# Patient Record
Sex: MALE | Race: WHITE | NOT HISPANIC OR LATINO | URBAN - METROPOLITAN AREA
[De-identification: names, ages, dates, MRNs, and addresses within clinical notes are randomized per-mention and may not be internally consistent; named-entity substitution may affect disease eponyms.]

---

## 2023-03-06 LAB
CHOLESTEROL (MG/DL) IN SER/PLAS: 156 MG/DL (ref 0–199)
CHOLESTEROL IN HDL (MG/DL) IN SER/PLAS: 58 MG/DL
CHOLESTEROL/HDL RATIO: 2.7
ESTIMATED AVERAGE GLUCOSE FOR HBA1C: 117 MG/DL
HEMOGLOBIN A1C/HEMOGLOBIN TOTAL IN BLOOD: 5.7 %
INR IN PPP BY COAGULATION ASSAY: 2.1 (ref 0.9–1.1)
LDL: 80 MG/DL (ref 0–99)
PROTHROMBIN TIME (PT) IN PPP BY COAGULATION ASSAY: 24.7 SEC (ref 9.8–13.4)
TRIGLYCERIDE (MG/DL) IN SER/PLAS: 89 MG/DL (ref 0–149)
VLDL: 18 MG/DL (ref 0–40)

## 2023-04-06 ENCOUNTER — TELEPHONE (OUTPATIENT)
Dept: PRIMARY CARE | Facility: CLINIC | Age: 83
End: 2023-04-06

## 2023-04-06 NOTE — TELEPHONE ENCOUNTER
Pt called and wants dr asked if its ok to take Natures Made Vitamin D 3 2000 iud, he's taking 2 tabs daily  Please advise

## 2023-07-14 DIAGNOSIS — Z00.00 ENCOUNTER FOR GENERAL ADULT MEDICAL EXAMINATION WITHOUT ABNORMAL FINDINGS: ICD-10-CM

## 2023-07-14 RX ORDER — SIMVASTATIN 40 MG/1
40 TABLET, FILM COATED ORAL NIGHTLY
Qty: 90 TABLET | Refills: 0 | Status: SHIPPED | OUTPATIENT
Start: 2023-07-14 | End: 2023-11-07

## 2023-09-15 ENCOUNTER — OFFICE VISIT (OUTPATIENT)
Dept: PRIMARY CARE | Facility: CLINIC | Age: 83
End: 2023-09-15
Payer: MEDICARE

## 2023-09-15 VITALS
BODY MASS INDEX: 23.32 KG/M2 | HEART RATE: 75 BPM | TEMPERATURE: 97.6 F | OXYGEN SATURATION: 98 % | WEIGHT: 148.58 LBS | HEIGHT: 67 IN

## 2023-09-15 DIAGNOSIS — F33.9 DEPRESSION, RECURRENT (CMS-HCC): ICD-10-CM

## 2023-09-15 DIAGNOSIS — R73.9 HYPERGLYCEMIA: ICD-10-CM

## 2023-09-15 DIAGNOSIS — N30.00 ACUTE CYSTITIS WITHOUT HEMATURIA: ICD-10-CM

## 2023-09-15 DIAGNOSIS — F41.1 GAD (GENERALIZED ANXIETY DISORDER): ICD-10-CM

## 2023-09-15 DIAGNOSIS — I26.99 PULMONARY INFARCTION (MULTI): ICD-10-CM

## 2023-09-15 DIAGNOSIS — C43.59 MALIGNANT MELANOMA OF OTHER PART OF TRUNK (MULTI): ICD-10-CM

## 2023-09-15 DIAGNOSIS — Z13.820 SCREENING FOR OSTEOPOROSIS: ICD-10-CM

## 2023-09-15 DIAGNOSIS — Z00.00 MEDICARE ANNUAL WELLNESS VISIT, SUBSEQUENT: Primary | ICD-10-CM

## 2023-09-15 DIAGNOSIS — I10 HYPERTENSION, UNSPECIFIED TYPE: ICD-10-CM

## 2023-09-15 PROBLEM — M81.8 OSTEOPOROSIS, IDIOPATHIC: Status: ACTIVE | Noted: 2023-09-15

## 2023-09-15 PROCEDURE — 1157F ADVNC CARE PLAN IN RCRD: CPT | Performed by: INTERNAL MEDICINE

## 2023-09-15 PROCEDURE — G0444 DEPRESSION SCREEN ANNUAL: HCPCS | Performed by: INTERNAL MEDICINE

## 2023-09-15 PROCEDURE — 99213 OFFICE O/P EST LOW 20 MIN: CPT | Performed by: INTERNAL MEDICINE

## 2023-09-15 PROCEDURE — 1159F MED LIST DOCD IN RCRD: CPT | Performed by: INTERNAL MEDICINE

## 2023-09-15 PROCEDURE — 1160F RVW MEDS BY RX/DR IN RCRD: CPT | Performed by: INTERNAL MEDICINE

## 2023-09-15 PROCEDURE — 1036F TOBACCO NON-USER: CPT | Performed by: INTERNAL MEDICINE

## 2023-09-15 PROCEDURE — G0439 PPPS, SUBSEQ VISIT: HCPCS | Performed by: INTERNAL MEDICINE

## 2023-09-15 PROCEDURE — 1170F FXNL STATUS ASSESSED: CPT | Performed by: INTERNAL MEDICINE

## 2023-09-15 RX ORDER — LATANOPROST 50 UG/ML
SOLUTION/ DROPS OPHTHALMIC
COMMUNITY

## 2023-09-15 RX ORDER — SAW PALMETTO 160 MG
160 CAPSULE ORAL 3 TIMES DAILY
COMMUNITY
Start: 2012-10-01 | End: 2023-10-13 | Stop reason: ALTCHOICE

## 2023-09-15 RX ORDER — TAMSULOSIN HYDROCHLORIDE 0.4 MG/1
0.8 CAPSULE ORAL DAILY
COMMUNITY
Start: 2011-01-04 | End: 2023-11-28 | Stop reason: WASHOUT

## 2023-09-15 RX ORDER — AMLODIPINE BESYLATE 5 MG/1
5 TABLET ORAL DAILY
Qty: 30 TABLET | Refills: 5 | Status: SHIPPED | OUTPATIENT
Start: 2023-09-15 | End: 2023-09-15 | Stop reason: SINTOL

## 2023-09-15 RX ORDER — FINASTERIDE 5 MG/1
5 TABLET, FILM COATED ORAL
COMMUNITY
Start: 2023-08-16 | End: 2023-11-28 | Stop reason: WASHOUT

## 2023-09-15 RX ORDER — LOSARTAN POTASSIUM AND HYDROCHLOROTHIAZIDE 12.5; 5 MG/1; MG/1
1 TABLET ORAL DAILY
Qty: 30 TABLET | Refills: 11 | Status: SHIPPED | OUTPATIENT
Start: 2023-09-15 | End: 2023-10-13 | Stop reason: ALTCHOICE

## 2023-09-15 RX ORDER — PAROXETINE 10 MG/1
10 TABLET, FILM COATED ORAL EVERY MORNING
Qty: 30 TABLET | Refills: 1 | Status: SHIPPED | OUTPATIENT
Start: 2023-09-15 | End: 2023-10-13 | Stop reason: ALTCHOICE

## 2023-09-15 ASSESSMENT — ACTIVITIES OF DAILY LIVING (ADL)
GROCERY_SHOPPING: INDEPENDENT
TAKING_MEDICATION: INDEPENDENT
DOING_HOUSEWORK: INDEPENDENT
DRESSING: INDEPENDENT
MANAGING_FINANCES: INDEPENDENT
BATHING: INDEPENDENT

## 2023-09-15 ASSESSMENT — PATIENT HEALTH QUESTIONNAIRE - PHQ9
4. FEELING TIRED OR HAVING LITTLE ENERGY: SEVERAL DAYS
10. IF YOU CHECKED OFF ANY PROBLEMS, HOW DIFFICULT HAVE THESE PROBLEMS MADE IT FOR YOU TO DO YOUR WORK, TAKE CARE OF THINGS AT HOME, OR GET ALONG WITH OTHER PEOPLE: SOMEWHAT DIFFICULT
5. POOR APPETITE OR OVEREATING: SEVERAL DAYS
1. LITTLE INTEREST OR PLEASURE IN DOING THINGS: SEVERAL DAYS
7. TROUBLE CONCENTRATING ON THINGS, SUCH AS READING THE NEWSPAPER OR WATCHING TELEVISION: NEARLY EVERY DAY
SUM OF ALL RESPONSES TO PHQ QUESTIONS 1-9: 13
3. TROUBLE FALLING OR STAYING ASLEEP OR SLEEPING TOO MUCH: SEVERAL DAYS
3. TROUBLE FALLING OR STAYING ASLEEP OR SLEEPING TOO MUCH: NEARLY EVERY DAY
SUM OF ALL RESPONSES TO PHQ9 QUESTIONS 1 AND 2: 4
4. FEELING TIRED OR HAVING LITTLE ENERGY: NEARLY EVERY DAY
8. MOVING OR SPEAKING SO SLOWLY THAT OTHER PEOPLE COULD HAVE NOTICED. OR THE OPPOSITE, BEING SO FIGETY OR RESTLESS THAT YOU HAVE BEEN MOVING AROUND A LOT MORE THAN USUAL: NOT AT ALL
2. FEELING DOWN, DEPRESSED OR HOPELESS: NOT AT ALL
1. LITTLE INTEREST OR PLEASURE IN DOING THINGS: NOT AT ALL
9. THOUGHTS THAT YOU WOULD BE BETTER OFF DEAD, OR OF HURTING YOURSELF: NOT AT ALL
10. IF YOU CHECKED OFF ANY PROBLEMS, HOW DIFFICULT HAVE THESE PROBLEMS MADE IT FOR YOU TO DO YOUR WORK, TAKE CARE OF THINGS AT HOME, OR GET ALONG WITH OTHER PEOPLE: SOMEWHAT DIFFICULT
6. FEELING BAD ABOUT YOURSELF - OR THAT YOU ARE A FAILURE OR HAVE LET YOURSELF OR YOUR FAMILY DOWN: NOT AT ALL
SUM OF ALL RESPONSES TO PHQ9 QUESTIONS 1 AND 2: 0
5. POOR APPETITE OR OVEREATING: NOT AT ALL
2. FEELING DOWN, DEPRESSED OR HOPELESS: NEARLY EVERY DAY

## 2023-09-15 NOTE — PROGRESS NOTES
Assessment and Plan:  Problem List Items Addressed This Visit       RESOLVED: Pulmonary infarction (CMS/HCC)     Advise follow-up with the pulmonary service for anticoagulation         Relevant Orders    Albumin , Urine Random    CBC and Auto Differential    Comprehensive Metabolic Panel    Hemoglobin A1C    Lipid Panel    TSH with reflex to Free T4 if abnormal    Uric Acid    Urinalysis Microscopic Only    Urine Culture    Depression, recurrent (CMS/HCC)     Depression is chronic and quite common and notorious mental health disorder and it is quite common and widespread, there are several therapeutics available for depression now a days. It is considered as chemical imbalance disorder and with medications , it can be adjusted. There are side effects from SSRI and SNRIs but on long term, they are well tolerated. Please do not feel awkward or shy to contact us if feel tired, sleepy, lack of energy or aloof/ alone. Untreated depression can bring serious consequences including suicidal ideations, mental health counselling is available if need arises. Usually treatment of depression is long lasting therapy with periodic evaluations and follow ups. Pt with depression no longer have to feel frustrated, helpless or isolated.Detailed discussion was carried out.           Relevant Orders    Albumin , Urine Random    CBC and Auto Differential    Comprehensive Metabolic Panel    Hemoglobin A1C    Lipid Panel    TSH with reflex to Free T4 if abnormal    Uric Acid    Urinalysis Microscopic Only    Urine Culture    Malignant melanoma of other part of trunk (CMS/HCC)     Advised to follow-up with dermatologist         Relevant Orders    Albumin , Urine Random    CBC and Auto Differential    Comprehensive Metabolic Panel    Hemoglobin A1C    Lipid Panel    TSH with reflex to Free T4 if abnormal    Uric Acid    Urinalysis Microscopic Only    Urine Culture    Hypertension     Patients BP readings reviewed and addressed, as we age our  arteries turn stiffer and less elastic. Restricting salt consumption and staying physically fit with regular exercise regimen is the only way to keep our vasculature less tonic. Studies have shown that keeping ideal body wt, exercise routine about 140 to 150 minutes a week, eating variety of plant based diet and drinking plentiful water are quite helpful. Monitor BP twice or once a week at home and bring log to be reviewed by me. Uncontrolled BP has long term consequences including heart failure, myocardial infarction, accelerated atherosclerosis and kidney dysfunction. Therapy reviewed and explained.           Relevant Medications    PARoxetine (Paxil) 10 mg tablet    losartan-hydrochlorothiazide (Hyzaar) 50-12.5 mg tablet    Other Relevant Orders    Albumin , Urine Random    CBC and Auto Differential    Comprehensive Metabolic Panel    Hemoglobin A1C    Lipid Panel    TSH with reflex to Free T4 if abnormal    Uric Acid    Urinalysis Microscopic Only    Urine Culture    CT cardiac scoring wo IV contrast    Medicare annual wellness visit, subsequent - Primary    Relevant Orders    Albumin , Urine Random    CBC and Auto Differential    Comprehensive Metabolic Panel    Hemoglobin A1C    Lipid Panel    TSH with reflex to Free T4 if abnormal    Uric Acid    Urinalysis Microscopic Only    Urine Culture    Hyperglycemia    Relevant Orders    Hemoglobin A1C    Acute cystitis without hematuria    Relevant Orders    Urine Culture    DAVID (generalized anxiety disorder)    Relevant Medications    PARoxetine (Paxil) 10 mg tablet         Chief Complaint:   Annual Medicare Wellness Office Exam/Comprehensive Problem Focused Follow Up and Physical Exam      HPI: This is a 88-year-old patient  with children personal history of BPH hypertension hyperlipidemia pulmonary emboli complaining anxiety depression insomnia onset gradual duration few weeks progressed slowly aggravating factor underlying stress    Also complaining of  protein with uncontrolled hypertension    Negative for headache chest pain hematuria    Recently had urinary discomfort went to urology diagnosis high PSA Brar catheter possible prostate cancer very about the same            Patient Active Problem List:  Patient Active Problem List   Diagnosis    Depression, recurrent (CMS/HCC)    Malignant melanoma of other part of trunk (CMS/HCC)    Hypertension    Medicare annual wellness visit, subsequent    Osteoporosis, idiopathic    Hyperglycemia    Acute cystitis without hematuria    DAVID (generalized anxiety disorder)          Comprehensive Medical/Surgical/Social/Family History:  Family History   Problem Relation Name Age of Onset    Diabetes Mother      Heart failure Mother      Heart failure Father         Past Medical History:   Diagnosis Date    Acute upper respiratory infection, unspecified 10/24/2019    Acute upper respiratory infection    Anxiety disorder, unspecified     Anxiety    Disorder of prostate, unspecified     Prostate disease    Dorsalgia, unspecified 11/28/2017    Dorsodynia    Encounter for screening for malignant neoplasm of colon 10/24/2019    Colon cancer screening    Encounter for screening for malignant neoplasm of rectum 10/24/2019    Screening for rectal cancer    Hyperosmolality and hypernatremia 06/14/2018    Hypernatremia    Impacted cerumen, bilateral 07/26/2017    Excessive ear wax, bilateral    Other conditions influencing health status 12/28/2016    History of cough    Other disorders of phosphorus metabolism 10/24/2016    Low phosphate levels    Pain in right finger(s) 06/14/2018    Pain of right thumb    Personal history of diseases of the skin and subcutaneous tissue 02/12/2015    History of folliculitis    Personal history of diseases of the skin and subcutaneous tissue 02/28/2020    History of eczema    Personal history of other diseases of the circulatory system     History of hypertension    Personal history of other diseases of the  digestive system 08/02/2021    History of gastroesophageal reflux (GERD)    Personal history of other diseases of the musculoskeletal system and connective tissue     Personal history of arthritis    Personal history of other diseases of the nervous system and sense organs 07/26/2017    History of ear pain    Personal history of other diseases of the nervous system and sense organs 07/26/2017    History of impacted cerumen    Personal history of other diseases of the respiratory system 11/10/2016    History of allergic rhinitis    Personal history of other endocrine, nutritional and metabolic disease     History of hyperlipidemia    Personal history of other endocrine, nutritional and metabolic disease 02/28/2020    History of hyperkalemia    Personal history of other endocrine, nutritional and metabolic disease 07/27/2016    History of dehydration    Personal history of other endocrine, nutritional and metabolic disease 07/25/2016    History of dehydration    Personal history of other infectious and parasitic diseases     History of varicella    Personal history of other mental and behavioral disorders 03/19/2014    History of depression    Personal history of other specified conditions     History of edema    Personal history of other specified conditions 03/07/2014    History of edema    Personal history of other venous thrombosis and embolism     H/O blood clots    Personal history of pulmonary embolism     Personal history of pulmonary embolism    Personal history of urinary (tract) infections 12/08/2015    History of urinary tract infection    Trigger finger, right ring finger 02/28/2020    Trigger ring finger of right hand       Past Surgical History:   Procedure Laterality Date    HERNIA REPAIR         Social History     Socioeconomic History    Marital status:      Spouse name: None    Number of children: None    Years of education: None    Highest education level: None   Occupational History    None    Tobacco Use    Smoking status: Never    Smokeless tobacco: Never   Substance and Sexual Activity    Alcohol use: Never    Drug use: Never    Sexual activity: None   Other Topics Concern    None   Social History Narrative    None     Social Determinants of Health     Financial Resource Strain: Not on file   Food Insecurity: Not on file   Transportation Needs: Not on file   Physical Activity: Not on file   Stress: Not on file   Social Connections: Not on file   Intimate Partner Violence: Not on file   Housing Stability: Not on file       Tobacco/Alcohol/Opioid use, as well as Illicit Drug Use was screened for/reviewed and documented in Social Documentation section of the chart and medication list as appropriate    Allergies and Medications  Allergies   Allergen Reactions    Animal Dander Unknown     Current Outpatient Medications   Medication Sig Dispense Refill    finasteride (Proscar) 5 mg tablet 1 tablet (5 mg).      latanoprost (Xalatan) 0.005 % ophthalmic solution INSTILL 1 DROP INTO LEFT EYE AT BEDTIME      multivitamin capsule Take 1 capsule by mouth once daily.      saw palmetto 160 mg capsule Take 1 capsule (160 mg) by mouth 3 times a day.      simvastatin (Zocor) 40 mg tablet Take 1 tablet (40 mg) by mouth once daily at bedtime. 90 tablet 0    tamsulosin (Flomax) 0.4 mg 24 hr capsule Take 2 capsules (0.8 mg) by mouth once daily.      warfarin (Coumadin) 5 mg tablet TAKE 1 TABLET EVERY DAY AND A 1/2 ON WEDNESDAY AND SUNDAY 90 tablet 3    losartan-hydrochlorothiazide (Hyzaar) 50-12.5 mg tablet Take 1 tablet by mouth once daily. 30 tablet 11    PARoxetine (Paxil) 10 mg tablet Take 1 tablet (10 mg) by mouth once daily in the morning. 30 tablet 1     No current facility-administered medications for this visit.       Medications and Supplements  prescribed by me and other practitioners or clinical pharmacist (such as prescriptions, OTC's, herbal therapies and supplements) were reviewed and documented in the  "medical record.     Advance directives  Advanced Care Planning (including a Living Will, Healthcare POA, as well as specific end of life choices and/or directives), was discussed for approximately 16 minutes with the patient and/or surrogate, voluntarily, and documented in the Problem List of the medical record.     Cardiac Risk Assessment  Cardiovascular risk was discussed and, if needed, lifestyle modifications recommended, including nutritional choices, exercise, and elimination of habits contributing to risk. We agreed on a plan to reduce the current cardiovascular risk based on above discussion as needed.  Aspirin use/disuse was discussed and documented in the Problem List of the medical record after reviewing the updated guidelines below:    Consider low dose Aspirin ( mg) use if the benefit for cardiovascular disease prevention outweighs risk for bleeding complications.   In general, low dose ASA should be considered:  In patients WITHOUT prior MI/stroke/PAD (primary prevention):   a. Age <60: Use if 10-year cardiovascular disease risk >20%, with discussion of risks and benefits with patient  b. Age 60-<70: Use if 10-year cardiovascular disease risk >20% and low bleeding (e.g., gastrointenstinal) risk, with discussion of risks and benefits with patient  c. Age >=70: Do not use    In patients WITH prior MI/stroke/PAD (secondary prevention):   Generally use unless extremely high bleeding (e.g., gastrointenstinal) risk, with discussion of risks and benefits with patient    ROS otherwise negative aside from what was mentioned above in HPI.    Visit Vitals  Pulse 75   Temp 36.4 °C (97.6 °F)   Ht 1.702 m (5' 7\")   Wt 67.4 kg (148 lb 9.3 oz)   SpO2 98%   BMI 23.27 kg/m²   Smoking Status Never   BSA 1.79 m²       Physical Exam      Physical Exam:    Appearance: Alert, oriented , cooperative,  in no acute distress. Well nourished & well hydrated.    Skin: Intact,  dry skin, no lesions, rash, petechiae or " purpura.     Eyes: PERRLA, EOMs intact,  Conjunctiva pink with no redness or exudates. Cornea & anterior chamber are clear, Eyelids without lesions. No scleral icterus.     ENT: Hearing grossly intact. External auditory canals patent, tympanic membranes intact with visible landmarks. Nares patent, mucus membranes moist. Dentition without lesions. Pharynx clear, uvula midline.     Neck: Supple, without meningismus. Thyroid not palpable. Trachea at midline. No lymphadenopathy.    Pulmonary: Crackles no accessory muscle use or stridor.    Cardiac: Heart murmur without bruits.    Abdomen: Soft, nontender, active bowel sounds.  No palpable organomegaly.  No rebound or guarding.  No CVA tenderness.    Genitourinary: Exam deferred.    Musculoskeletal: Arthritis. No cyanosis, clubbing, or edema.    Neurological: Neurology, normal sensation, no weakness, no focal findings identified.    Psychiatric: Appropriate mood and affect.     During the course of the visit the patient was educated and counseled about age appropriate screening and preventive services. Completed preventive screenings were documented in the chart and orders were placed for outstanding screenings/procedures as documented in the Assessment and Plan.    Patient Instructions (the written plan) was given to the patient at check out.    Charting was completed using voice recognition technology and may include unintended errors.

## 2023-10-13 ENCOUNTER — OFFICE VISIT (OUTPATIENT)
Dept: PRIMARY CARE | Facility: CLINIC | Age: 83
End: 2023-10-13
Payer: MEDICARE

## 2023-10-13 VITALS
DIASTOLIC BLOOD PRESSURE: 71 MMHG | OXYGEN SATURATION: 95 % | HEIGHT: 67 IN | WEIGHT: 140.6 LBS | BODY MASS INDEX: 22.07 KG/M2 | SYSTOLIC BLOOD PRESSURE: 157 MMHG | HEART RATE: 89 BPM

## 2023-10-13 DIAGNOSIS — I15.9 SECONDARY HYPERTENSION: ICD-10-CM

## 2023-10-13 DIAGNOSIS — N18.32 STAGE 3B CHRONIC KIDNEY DISEASE (MULTI): Primary | ICD-10-CM

## 2023-10-13 DIAGNOSIS — R60.9 EDEMA, UNSPECIFIED TYPE: ICD-10-CM

## 2023-10-13 DIAGNOSIS — I50.9 CONGESTIVE HEART FAILURE, UNSPECIFIED HF CHRONICITY, UNSPECIFIED HEART FAILURE TYPE (MULTI): ICD-10-CM

## 2023-10-13 DIAGNOSIS — F33.9 DEPRESSION, RECURRENT (CMS-HCC): ICD-10-CM

## 2023-10-13 LAB
NON-UH HIE A/G RATIO: 1.2
NON-UH HIE ALB: 3.2 G/DL (ref 3.4–5)
NON-UH HIE ALK PHOS: 72 UNIT/L (ref 46–116)
NON-UH HIE B-TYPE NATRIURETIC PEPTIDE: 15 PG/ML (ref 0–100)
NON-UH HIE BASO COUNT: 0.05 X1000 (ref 0–0.2)
NON-UH HIE BASOS %: 0.6 %
NON-UH HIE BILIRUBIN, TOTAL: 0.5 MG/DL (ref 0.2–1)
NON-UH HIE BUN/CREAT RATIO: 22
NON-UH HIE BUN: 22 MG/DL (ref 9–23)
NON-UH HIE CALCIUM: 8.8 MG/DL (ref 8.7–10.4)
NON-UH HIE CALCULATED LDL CHOLESTEROL: 69 MG/DL (ref 60–130)
NON-UH HIE CALCULATED OSMOLALITY: 283 MOSM/KG (ref 275–295)
NON-UH HIE CHLORIDE: 107 MMOL/L (ref 98–107)
NON-UH HIE CHOLESTEROL: 140 MG/DL (ref 100–200)
NON-UH HIE CO2, VENOUS: 30 MMOL/L (ref 20–31)
NON-UH HIE CREATININE: 1 MG/DL (ref 0.6–1.1)
NON-UH HIE DIFF?: NO
NON-UH HIE EOS COUNT: 0.06 X1000 (ref 0–0.5)
NON-UH HIE EOSIN %: 0.7 %
NON-UH HIE GFR AA: >60
NON-UH HIE GLOBULIN: 2.6 G/DL
NON-UH HIE GLOMERULAR FILTRATION RATE: >60 ML/MIN/1.73M?
NON-UH HIE GLUCOSE: 95 MG/DL (ref 74–106)
NON-UH HIE GOT: 37 UNIT/L (ref 15–37)
NON-UH HIE GPT: 36 UNIT/L (ref 10–49)
NON-UH HIE HCT: 30.9 % (ref 41–52)
NON-UH HIE HDL CHOLESTEROL: 40 MG/DL (ref 40–60)
NON-UH HIE HGB A1C: 5.8 %
NON-UH HIE HGB: 10.6 G/DL (ref 13.5–17.5)
NON-UH HIE INR: 7.3
NON-UH HIE INSTR WBC: 8.5
NON-UH HIE K: 4.5 MMOL/L (ref 3.5–5.1)
NON-UH HIE LYMPH %: 13.1 %
NON-UH HIE LYMPH COUNT: 1.11 X1000 (ref 1.2–4.8)
NON-UH HIE MCH: 29.8 PG (ref 27–34)
NON-UH HIE MCHC: 34.3 G/DL (ref 32–37)
NON-UH HIE MCV: 86.8 FL (ref 80–100)
NON-UH HIE MONO %: 8.2 %
NON-UH HIE MONO COUNT: 0.7 X1000 (ref 0.1–1)
NON-UH HIE MPV: 6.6 FL (ref 7.4–10.4)
NON-UH HIE NA: 140 MMOL/L (ref 135–145)
NON-UH HIE NEUTROPHIL %: 77.4 %
NON-UH HIE NEUTROPHIL COUNT (ANC): 6.58 X1000 (ref 1.4–8.8)
NON-UH HIE NUCLEATED RBC: 0 /100WBC
NON-UH HIE PLATELET: 535 X10 (ref 150–450)
NON-UH HIE PROSTATIC SPECIFIC ANTIGEN: 4.5 NG/ML (ref 0–4)
NON-UH HIE PROTIME PATIENT: 84.7 SECONDS (ref 9.8–12.8)
NON-UH HIE RBC: 3.56 X10 (ref 4.7–6.1)
NON-UH HIE RDW: 14.1 % (ref 11.5–14.5)
NON-UH HIE TOTAL CHOL/HDL CHOL RATIO: 3.5
NON-UH HIE TOTAL PROTEIN: 5.8 G/DL (ref 5.7–8.2)
NON-UH HIE TRIGLYCERIDES: 156 MG/DL (ref 30–150)
NON-UH HIE TSH: 1.21 UIU/ML (ref 0.55–4.78)
NON-UH HIE VIT D 25: 87 NG/ML
NON-UH HIE WBC: 8.5 X10 (ref 4.5–11)

## 2023-10-13 PROCEDURE — 1159F MED LIST DOCD IN RCRD: CPT | Performed by: INTERNAL MEDICINE

## 2023-10-13 PROCEDURE — 1160F RVW MEDS BY RX/DR IN RCRD: CPT | Performed by: INTERNAL MEDICINE

## 2023-10-13 PROCEDURE — 99214 OFFICE O/P EST MOD 30 MIN: CPT | Performed by: INTERNAL MEDICINE

## 2023-10-13 PROCEDURE — 3077F SYST BP >= 140 MM HG: CPT | Performed by: INTERNAL MEDICINE

## 2023-10-13 PROCEDURE — 3078F DIAST BP <80 MM HG: CPT | Performed by: INTERNAL MEDICINE

## 2023-10-13 PROCEDURE — 1036F TOBACCO NON-USER: CPT | Performed by: INTERNAL MEDICINE

## 2023-10-13 RX ORDER — ASPIRIN 81 MG/1
81 TABLET ORAL DAILY
COMMUNITY
Start: 2011-08-01 | End: 2023-11-28 | Stop reason: WASHOUT

## 2023-10-13 RX ORDER — FUROSEMIDE 20 MG/1
20 TABLET ORAL DAILY
Qty: 30 TABLET | Refills: 0 | Status: SHIPPED | OUTPATIENT
Start: 2023-10-13 | End: 2023-11-07

## 2023-10-13 RX ORDER — POTASSIUM CHLORIDE 750 MG/1
10 TABLET, FILM COATED, EXTENDED RELEASE ORAL DAILY
Qty: 30 TABLET | Refills: 0 | Status: SHIPPED | OUTPATIENT
Start: 2023-10-13 | End: 2023-11-07

## 2023-10-13 RX ORDER — CHOLECALCIFEROL (VITAMIN D3) 50 MCG
50 TABLET ORAL DAILY
COMMUNITY
Start: 2018-12-13 | End: 2023-11-28 | Stop reason: WASHOUT

## 2023-10-14 PROBLEM — Z00.00 MEDICARE ANNUAL WELLNESS VISIT, SUBSEQUENT: Status: RESOLVED | Noted: 2023-09-15 | Resolved: 2023-10-14

## 2023-10-14 PROBLEM — N18.32 STAGE 3B CHRONIC KIDNEY DISEASE (MULTI): Status: ACTIVE | Noted: 2023-10-14

## 2023-10-14 PROBLEM — I50.9 CONGESTIVE HEART FAILURE (MULTI): Status: ACTIVE | Noted: 2023-10-14

## 2023-10-14 PROBLEM — I50.40 COMBINED SYSTOLIC AND DIASTOLIC CONGESTIVE HEART FAILURE (MULTI): Status: ACTIVE | Noted: 2023-10-14

## 2023-10-14 PROBLEM — R60.9 EDEMA: Status: ACTIVE | Noted: 2023-10-14

## 2023-10-14 PROBLEM — C43.59 MALIGNANT MELANOMA OF OTHER PART OF TRUNK (MULTI): Status: RESOLVED | Noted: 2023-09-15 | Resolved: 2023-10-14

## 2023-10-14 NOTE — PROGRESS NOTES
Subjective   Patient ID: Jordan Diaz is a 82 y.o. male who presents for Follow-up (4 WEEK FOLLOW UP, having surgery for prostate cancer end of this month).    Assessment/Plan     Problem List Items Addressed This Visit       Depression, recurrent (CMS/HCC)     Depression is chronic and quite common and notorious mental health disorder and it is quite common and widespread, there are several therapeutics available for depression now a days. It is considered as chemical imbalance disorder and with medications , it can be adjusted. There are side effects from SSRI and SNRIs but on long term, they are well tolerated. Please do not feel awkward or shy to contact us if feel tired, sleepy, lack of energy or aloof/ alone. Untreated depression can bring serious consequences including suicidal ideations, mental health counselling is available if need arises. Usually treatment of depression is long lasting therapy with periodic evaluations and follow ups. Pt with depression no longer have to feel frustrated, helpless or isolated.Detailed discussion was carried out.           Hypertension    Congestive heart failure (CMS/HCC)     Pt has CHF, diastolic or systolic were specified, usually three times  a week weight monitoring, salt restriction up to 2 GM Na diet, fluid restriction and continuation of therapy as recommended to be continued. HOB can be kept somewhat elevated at night. Any dyspnea or more then 5 lb weight gain or leg swelling need to be notified, please call if any of above sympts happen and pt will be addressed if possible on outpatient setting. Light exercises are always beneficial with fresh air and deep breathing exercises. Please follow up with us as directed.          Relevant Medications    furosemide (Lasix) tablet 60 mg    Stage 3b chronic kidney disease (CMS/HCC) - Primary     CKD and various stages of CKD and significance explained, CKD is very common and rising diagnosis among [place] adults. Main  culprits for CKD etiology needs to be attended well. GFR values explained. Nephrotoxic agents has to be avoided, plenty of water consumption is always beneficial. Avoid NSAIDs, always check with MD about usage of OTC medications or any other Rx given by other providers. GFR less then 10 usually will need renal replacement therapy. Episodic check on renal functions needed. Always ask MD about GFR at next visit. Avoid dehydration.           Edema    Relevant Medications    furosemide (Lasix) 20 mg tablet    potassium chloride CR (Klor-Con) 10 mEq ER tablet   Patient was evaluated today, problem list was reviewed, problems and concerns addressed, Rx list reviewed and updated, lab and tests were noted and reviewed. Life style changes were discussed, always it works better if we eat plant based diet and plenty of fibres and roughage. Consume adequate amount of water and avoid alcohol, light to moderate physical activities and stress reduction are always beneficial for ongoing physical well being. Do not forget to have 6 to 7 hours of sleep regularly and avoid late night tomeka screen exposure.      HPI this is a 82-year-old patient with a prostate cancer glaucoma hypertension hyperlipidemia chronic kidney disease heart disease today came my office complaining of the both lower extremity swelling onset acutely duration 2 weeks progressed acutely aggravating factor underlying chronic kidney disease chronic heart disease SVT    Negative for bleeding    Negative for hypoxia    Negative for chest pain    Negative for suicide    Recently patient was given Hyzaar blood pressure is low patient stopped taking medication    Medical problem prostate cancer BPH urology evaluation    SVT continue Coumadin    Hyperlipidemia simvastatin    Glaucoma eyedrop    CHF Lasix potassium magnesium    Give Lasix 60 intramuscular without any complication  Past Medical History:   Diagnosis Date    Acute upper respiratory infection, unspecified  10/24/2019    Acute upper respiratory infection    Anxiety disorder, unspecified     Anxiety    Disorder of prostate, unspecified     Prostate disease    Dorsalgia, unspecified 11/28/2017    Dorsodynia    Encounter for screening for malignant neoplasm of colon 10/24/2019    Colon cancer screening    Encounter for screening for malignant neoplasm of rectum 10/24/2019    Screening for rectal cancer    Hyperosmolality and hypernatremia 06/14/2018    Hypernatremia    Impacted cerumen, bilateral 07/26/2017    Excessive ear wax, bilateral    Other conditions influencing health status 12/28/2016    History of cough    Other disorders of phosphorus metabolism 10/24/2016    Low phosphate levels    Pain in right finger(s) 06/14/2018    Pain of right thumb    Personal history of diseases of the skin and subcutaneous tissue 02/12/2015    History of folliculitis    Personal history of diseases of the skin and subcutaneous tissue 02/28/2020    History of eczema    Personal history of other diseases of the circulatory system     History of hypertension    Personal history of other diseases of the digestive system 08/02/2021    History of gastroesophageal reflux (GERD)    Personal history of other diseases of the musculoskeletal system and connective tissue     Personal history of arthritis    Personal history of other diseases of the nervous system and sense organs 07/26/2017    History of ear pain    Personal history of other diseases of the nervous system and sense organs 07/26/2017    History of impacted cerumen    Personal history of other diseases of the respiratory system 11/10/2016    History of allergic rhinitis    Personal history of other endocrine, nutritional and metabolic disease     History of hyperlipidemia    Personal history of other endocrine, nutritional and metabolic disease 02/28/2020    History of hyperkalemia    Personal history of other endocrine, nutritional and metabolic disease 07/27/2016    History of  dehydration    Personal history of other endocrine, nutritional and metabolic disease 07/25/2016    History of dehydration    Personal history of other infectious and parasitic diseases     History of varicella    Personal history of other mental and behavioral disorders 03/19/2014    History of depression    Personal history of other specified conditions     History of edema    Personal history of other specified conditions 03/07/2014    History of edema    Personal history of other venous thrombosis and embolism     H/O blood clots    Personal history of pulmonary embolism     Personal history of pulmonary embolism    Personal history of urinary (tract) infections 12/08/2015    History of urinary tract infection    Trigger finger, right ring finger 02/28/2020    Trigger ring finger of right hand     Past Surgical History:   Procedure Laterality Date    HERNIA REPAIR       Allergies   Allergen Reactions    Animal Dander Unknown     Current Outpatient Medications   Medication Sig Dispense Refill    aspirin 81 mg EC tablet Take 1 tablet (81 mg) by mouth once daily.      cholecalciferol (Vitamin D-3) 50 MCG (2000 UT) tablet Take 1 tablet (50 mcg) by mouth once daily.      finasteride (Proscar) 5 mg tablet 1 tablet (5 mg).      latanoprost (Xalatan) 0.005 % ophthalmic solution INSTILL 1 DROP INTO LEFT EYE AT BEDTIME      multivitamin capsule Take 1 capsule by mouth once daily.      simvastatin (Zocor) 40 mg tablet Take 1 tablet (40 mg) by mouth once daily at bedtime. 90 tablet 0    tamsulosin (Flomax) 0.4 mg 24 hr capsule Take 2 capsules (0.8 mg) by mouth once daily.      warfarin (Coumadin) 5 mg tablet TAKE 1 TABLET EVERY DAY AND A 1/2 ON WEDNESDAY AND SUNDAY 90 tablet 3    furosemide (Lasix) 20 mg tablet Take 1 tablet (20 mg) by mouth once daily. 30 tablet 0    potassium chloride CR (Klor-Con) 10 mEq ER tablet Take 1 tablet (10 mEq) by mouth once daily. Do not crush, chew, or split. 30 tablet 0     Current  "Facility-Administered Medications   Medication Dose Route Frequency Provider Last Rate Last Admin    furosemide (Lasix) tablet 60 mg  60 mg oral Once Annamarie Gaspar MD         Family History   Problem Relation Name Age of Onset    Diabetes Mother      Heart failure Mother      Heart failure Father       Social History     Socioeconomic History    Marital status:      Spouse name: None    Number of children: None    Years of education: None    Highest education level: None   Occupational History    None   Tobacco Use    Smoking status: Never    Smokeless tobacco: Never   Vaping Use    Vaping Use: Never used   Substance and Sexual Activity    Alcohol use: Never    Drug use: Never    Sexual activity: None   Other Topics Concern    None   Social History Narrative    None     Social Determinants of Health     Financial Resource Strain: Not on file   Food Insecurity: Not on file   Transportation Needs: Not on file   Physical Activity: Not on file   Stress: Not on file   Social Connections: Not on file   Intimate Partner Violence: Not on file   Housing Stability: Not on file     Immunization History   Administered Date(s) Administered    Pfizer Purple Cap SARS-CoV-2 04/16/2021, 05/07/2021    Pneumococcal conjugate vaccine, 13-valent (PREVNAR 13) 04/27/2016    Pneumococcal polysaccharide vaccine, 23-valent, age 2 years and older (PNEUMOVAX 23) 03/12/2012    Tdap vaccine, age 7 year and older (BOOSTRIX) 08/01/2011    Zoster vaccine, recombinant, adult (SHINGRIX) 07/26/2019, 02/28/2020    Zoster, live 06/01/2016       Review of Systems  Review of systems is otherwise negative unless stated above or in history of present illness.    Objective   Visit Vitals  /71   Pulse 89   Ht 1.702 m (5' 7\")   Wt 63.8 kg (140 lb 9.6 oz)   SpO2 95%   BMI 22.02 kg/m²   Smoking Status Never   BSA 1.74 m²     Physical Exam  Constitutional:       General: not in acute distress.   HENT: Sinus congestion     Head: Normocephalic and " atraumatic.      Nose: Nose normal.   Eyes: Eyeglasses     Extraocular Movements: Extraocular movements intact.      Conjunctiva/sclera: Conjunctivae normal.   Cardiovascular: S1-S2 or S3 3+ ankle edema     Rate and Rhythm: Normal rate ,  No M/R/G  Pulmonary:      Effort: Pulmonary effort is normal.      Breath sounds: Normal, Bilat Equal AE  Skin:     General: Skin is warm.   Neurological: Neuralgia     Mental Status: He is alert and oriented to person, place, and time.   Psychiatric:    Depression without suicide     Mood and Affect: Mood normal.         Behavior: Behavior normal.   Musculoskeletal   FROM in all extremitirs,  Joint-no swelling or tenderness    Orders Only on 10/13/2023   Component Date Value Ref Range Status    [facility] Nucleated RBC 10/13/2023 0  /100WBC Final    [facility] HCT 10/13/2023 30.9 (L)  41.0 - 52.0 % Final    [facility] Platelet 10/13/2023 535 (H)  150 - 450 x10 Final    [facility] RBC 10/13/2023 3.56 (L)  4.70 - 6.10 x10 Final    [facility] DIFF? 10/13/2023 No   Final    [facility] Instr WBC 10/13/2023 8.5   Final    [facility] MCHC 10/13/2023 34.3  32.0 - 37.0 g/dL Final    [facility] MCV 10/13/2023 86.8  80.0 - 100.0 fL Final    [facility] HGB 10/13/2023 10.6 (L)  13.5 - 17.5 g/dL Final    [facility] MPV 10/13/2023 6.6 (L)  7.4 - 10.4 fL Final    [facility] WBC 10/13/2023 8.5  4.5 - 11.0 x10 Final    [facility] RDW 10/13/2023 14.1  11.5 - 14.5 % Final    [facility] MCH 10/13/2023 29.8  27.0 - 34.0 pg Final    [facility] Basos % 10/13/2023 0.6  % Final    [facility] Baso Count 10/13/2023 0.05  0.00 - 0.20 x1000 Final    [facility] Mono Count 10/13/2023 0.70  0.10 - 1.00 x1000 Final    [facility] Mono % 10/13/2023 8.2  % Final    [facility] Neutrophil % 10/13/2023 77.4  % Final    [facility] Neutrophil Count (ANC) 10/13/2023 6.58  1.40 - 8.80 x1000 Final    [facility] Eosin % 10/13/2023 0.7  % Final    [facility] Eos Count 10/13/2023 0.06  0.00 - 0.50 x1000 Final    [facility]  Lymph Count 10/13/2023 1.11 (L)  1.20 - 4.80 x1000 Final    [facility] Lymph % 10/13/2023 13.1  % Final    [facility] HGB A1C 10/13/2023 5.8  % Final    [facility] Prostatic Specific Anti* 10/13/2023 4.5 (H)  0.0 - 4.0 ng/mL Final    [facility] Bilirubin, Total 10/13/2023 0.50  0.20 - 1.00 mg/dL Final    [facility] GFR AA 10/13/2023 >60   Final    [facility] Chloride 10/13/2023 107  98 - 107 mmol/L Final    [facility] A/G Ratio 10/13/2023 1.2   Final    [facility] Na 10/13/2023 140  135 - 145 mmol/L Final    [facility] BUN/Creat Ratio 10/13/2023 22.0   Final    [facility] Creatinine 10/13/2023 1.0  0.6 - 1.1 mg/dL Final    [facility] Alk Phos 10/13/2023 72  46 - 116 unit/L Final    [facility] GPT 10/13/2023 36  10 - 49 unit/L Final    [facility] CO2, venous 10/13/2023 30.0  20.0 - 31.0 mmol/L Final    [facility] Total Protein 10/13/2023 5.8  5.7 - 8.2 g/dL Final    [facility] Calculated Osmolality 10/13/2023 283  275 - 295 mOsm/kg Final    [facility] Glomerular Filtration R* 10/13/2023 >60  mL/min/1.73m? Final    [facility] K 10/13/2023 4.5  3.5 - 5.1 mmol/L Final    [facility] Globulin 10/13/2023 2.6  g/dL Final    [facility] Calcium 10/13/2023 8.8  8.7 - 10.4 mg/dL Final    [facility] BUN 10/13/2023 22  9 - 23 mg/dL Final    [facility] Glucose 10/13/2023 95  74 - 106 mg/dL Final    [facility] GOT 10/13/2023 37  15 - 37 unit/L Final    [facility] ALB 10/13/2023 3.2 (L)  3.4 - 5.0 g/dL Final    [facility] Triglycerides 10/13/2023 156 (H)  30 - 150 mg/dL Final    [facility] Cholesterol 10/13/2023 140  100 - 200 mg/dL Final    [facility] Calculated LDL Choleste* 10/13/2023 69  60 - 130 mg/dL Final    [facility] HDL Cholesterol 10/13/2023 40  40 - 60 mg/dL Final    [facility] Total Chol/HDL Chol Rat* 10/13/2023 3.5   Final    [facility] B-type Natriuretic Pept* 10/13/2023 15  0 - 100 pg/mL Final    [facility] TSH 10/13/2023 1.21  0.55 - 4.78 uIU/ml Final    [facility] Vit D 25 10/13/2023 87  ng/mL Final    [facility]  Protime Patient 10/13/2023 84.7 (H)  9.8 - 12.8 seconds Final    [facility] INR 10/13/2023 7.3 (AA)   Final       Radiology: Reviewed imaging in powerchart.  No results found.      Charting was completed using voice recognition technology and may include unintended errors.

## 2023-10-16 ENCOUNTER — TELEPHONE (OUTPATIENT)
Dept: PRIMARY CARE | Facility: CLINIC | Age: 83
End: 2023-10-16
Payer: MEDICARE

## 2023-10-16 NOTE — TELEPHONE ENCOUNTER
----- Message from Annamarie Gaspar MD sent at 10/13/2023  5:04 PM EDT -----  Advise not to take Coumadin check PT/INR on Monday watch for bleeding personally call patient and inform

## 2023-10-16 NOTE — TELEPHONE ENCOUNTER
----- Message from Annamarie Gaspar MD sent at 10/16/2023 10:29 AM EDT -----  Check CBC PT/INR today  ----- Message -----  From: Eden Larkin MA  Sent: 10/16/2023   9:32 AM EDT  To: Annamarie Gaspar MD    Spoke with pt he stopped coumadin and wants to know if you can review his chest xray because pt states he is coughing like crazy  ----- Message -----  From: Annamarie Gaspar MD  Sent: 10/13/2023   5:04 PM EDT  To: Eden Larkin MA    Advise not to take Coumadin check PT/INR on Monday watch for bleeding personally call patient and inform

## 2023-10-18 ENCOUNTER — TELEPHONE (OUTPATIENT)
Dept: PRIMARY CARE | Facility: CLINIC | Age: 83
End: 2023-10-18

## 2023-10-18 ENCOUNTER — TELEPHONE (OUTPATIENT)
Dept: PRIMARY CARE | Facility: CLINIC | Age: 83
End: 2023-10-18
Payer: MEDICARE

## 2023-10-18 NOTE — TELEPHONE ENCOUNTER
PT STATES HE WAS PUT ON MEDICATION FOR POTASSIUM AND A WATER PILL. HE WOULD LIKE TO KNOW HOW LONG HE WILL BE TAKING MEDICATIONS ? HE HAS DRY MOUTH AND URINATING A LOT.

## 2023-10-18 NOTE — TELEPHONE ENCOUNTER
Patient called and is asking how long he will need to take the water pill that was prescribed for him.  Please advise

## 2023-11-07 DIAGNOSIS — R60.9 EDEMA, UNSPECIFIED TYPE: ICD-10-CM

## 2023-11-07 DIAGNOSIS — Z00.00 ENCOUNTER FOR GENERAL ADULT MEDICAL EXAMINATION WITHOUT ABNORMAL FINDINGS: ICD-10-CM

## 2023-11-07 RX ORDER — SIMVASTATIN 40 MG/1
40 TABLET, FILM COATED ORAL NIGHTLY
Qty: 90 TABLET | Refills: 0 | Status: SHIPPED | OUTPATIENT
Start: 2023-11-07 | End: 2024-04-05

## 2023-11-07 RX ORDER — FUROSEMIDE 20 MG/1
20 TABLET ORAL DAILY
Qty: 30 TABLET | Refills: 0 | Status: SHIPPED | OUTPATIENT
Start: 2023-11-07 | End: 2023-11-27 | Stop reason: ALTCHOICE

## 2023-11-07 RX ORDER — POTASSIUM CHLORIDE 750 MG/1
10 TABLET, EXTENDED RELEASE ORAL DAILY
Qty: 30 TABLET | Refills: 0 | Status: SHIPPED | OUTPATIENT
Start: 2023-11-07 | End: 2023-11-27 | Stop reason: ALTCHOICE

## 2023-11-27 ENCOUNTER — OFFICE VISIT (OUTPATIENT)
Dept: PRIMARY CARE | Facility: CLINIC | Age: 83
End: 2023-11-27
Payer: MEDICARE

## 2023-11-27 VITALS
HEIGHT: 67 IN | BODY MASS INDEX: 21.82 KG/M2 | OXYGEN SATURATION: 98 % | SYSTOLIC BLOOD PRESSURE: 126 MMHG | WEIGHT: 139 LBS | HEART RATE: 63 BPM | DIASTOLIC BLOOD PRESSURE: 68 MMHG | TEMPERATURE: 97 F

## 2023-11-27 DIAGNOSIS — Z98.890 HISTORY OF CYSTOSCOPY: ICD-10-CM

## 2023-11-27 DIAGNOSIS — H66.90 EAR INFECTION: ICD-10-CM

## 2023-11-27 DIAGNOSIS — D62 ACUTE BLOOD LOSS AS CAUSE OF POSTOPERATIVE ANEMIA: ICD-10-CM

## 2023-11-27 DIAGNOSIS — N18.32 STAGE 3B CHRONIC KIDNEY DISEASE (MULTI): ICD-10-CM

## 2023-11-27 DIAGNOSIS — Z90.79 S/P TURP: ICD-10-CM

## 2023-11-27 DIAGNOSIS — Z09 HOSPITAL DISCHARGE FOLLOW-UP: Primary | ICD-10-CM

## 2023-11-27 DIAGNOSIS — I10 HYPERTENSION, UNSPECIFIED TYPE: ICD-10-CM

## 2023-11-27 DIAGNOSIS — R73.9 HYPERGLYCEMIA: ICD-10-CM

## 2023-11-27 DIAGNOSIS — H61.23 EXCESSIVE EAR WAX, BILATERAL: ICD-10-CM

## 2023-11-27 DIAGNOSIS — D50.0 IRON DEFICIENCY ANEMIA DUE TO CHRONIC BLOOD LOSS: ICD-10-CM

## 2023-11-27 DIAGNOSIS — N40.0 BENIGN PROSTATIC HYPERPLASIA WITHOUT LOWER URINARY TRACT SYMPTOMS: ICD-10-CM

## 2023-11-27 DIAGNOSIS — F33.9 DEPRESSION, RECURRENT (CMS-HCC): ICD-10-CM

## 2023-11-27 LAB
NON-UH HIE A/G RATIO: 1.2
NON-UH HIE ALB: 3.5 G/DL (ref 3.4–5)
NON-UH HIE ALK PHOS: 92 UNIT/L (ref 45–117)
NON-UH HIE BASO COUNT: 0.03 X1000 (ref 0–0.2)
NON-UH HIE BASOS %: 0.4 %
NON-UH HIE BILIRUBIN, TOTAL: 0.3 MG/DL (ref 0.3–1.2)
NON-UH HIE BUN/CREAT RATIO: 15.5
NON-UH HIE BUN: 17 MG/DL (ref 9–23)
NON-UH HIE CALCIUM: 9 MG/DL (ref 8.7–10.4)
NON-UH HIE CALCULATED OSMOLALITY: 289 MOSM/KG (ref 275–295)
NON-UH HIE CHLORIDE: 110 MMOL/L (ref 98–107)
NON-UH HIE CO2, VENOUS: 30 MMOL/L (ref 20–31)
NON-UH HIE CREATININE: 1.1 MG/DL (ref 0.6–1.1)
NON-UH HIE DIFF?: NO
NON-UH HIE DXH ACTIONS: ABNORMAL
NON-UH HIE EOS COUNT: 0.19 X1000 (ref 0–0.5)
NON-UH HIE EOSIN %: 2.7 %
NON-UH HIE GFR AA: >60
NON-UH HIE GLOBULIN: 2.9 G/DL
NON-UH HIE GLOMERULAR FILTRATION RATE: >60 ML/MIN/1.73M?
NON-UH HIE GLUCOSE: 100 MG/DL (ref 74–106)
NON-UH HIE GOT: 22 UNIT/L (ref 15–37)
NON-UH HIE GPT: 13 UNIT/L (ref 10–49)
NON-UH HIE HCT: 34 % (ref 41–52)
NON-UH HIE HGB A1C: 5.1 %
NON-UH HIE HGB: 10.8 G/DL (ref 13.5–17.5)
NON-UH HIE INR: 2.3
NON-UH HIE INSTR WBC: 6.9
NON-UH HIE K: 5.1 MMOL/L (ref 3.5–5.1)
NON-UH HIE LYMPH %: 17.9 %
NON-UH HIE LYMPH COUNT: 1.24 X1000 (ref 1.2–4.8)
NON-UH HIE MAGNESIUM: 2.1 MG/DL (ref 1.6–2.6)
NON-UH HIE MCH: 26.8 PG (ref 27–34)
NON-UH HIE MCHC: 31.8 G/DL (ref 32–37)
NON-UH HIE MCV: 84.5 FL (ref 80–100)
NON-UH HIE MONO %: 7.3 %
NON-UH HIE MONO COUNT: 0.5 X1000 (ref 0.1–1)
NON-UH HIE MPV: 7.1 FL (ref 7.4–10.4)
NON-UH HIE NA: 144 MMOL/L (ref 135–145)
NON-UH HIE NEUTROPHIL %: 71.8 %
NON-UH HIE NEUTROPHIL COUNT (ANC): 4.96 X1000 (ref 1.4–8.8)
NON-UH HIE NUCLEATED RBC: 0 /100WBC
NON-UH HIE PLATELET: 394 X10 (ref 150–450)
NON-UH HIE PROSTATIC SPECIFIC AG SCREEN: 0.8 NG/ML (ref 0–4)
NON-UH HIE PROTIME PATIENT: 26 SECONDS (ref 9.8–12.8)
NON-UH HIE RBC: 4.03 X10 (ref 4.7–6.1)
NON-UH HIE RDW: 16.7 % (ref 11.5–14.5)
NON-UH HIE RED BLOOD CELL MORPHOLOGY: ABNORMAL
NON-UH HIE SCAN DIFFERENTIAL: ABNORMAL
NON-UH HIE TOTAL PROTEIN: 6.4 G/DL (ref 5.7–8.2)
NON-UH HIE TSH: 1.21 UIU/ML (ref 0.55–4.78)
NON-UH HIE URIC ACID: 7.6 MG/DL (ref 3.7–9.2)
NON-UH HIE WBC: 6.9 X10 (ref 4.5–11)

## 2023-11-27 PROCEDURE — 1160F RVW MEDS BY RX/DR IN RCRD: CPT | Performed by: INTERNAL MEDICINE

## 2023-11-27 PROCEDURE — 1159F MED LIST DOCD IN RCRD: CPT | Performed by: INTERNAL MEDICINE

## 2023-11-27 PROCEDURE — 1036F TOBACCO NON-USER: CPT | Performed by: INTERNAL MEDICINE

## 2023-11-27 PROCEDURE — 3078F DIAST BP <80 MM HG: CPT | Performed by: INTERNAL MEDICINE

## 2023-11-27 PROCEDURE — 3074F SYST BP LT 130 MM HG: CPT | Performed by: INTERNAL MEDICINE

## 2023-11-27 PROCEDURE — 99214 OFFICE O/P EST MOD 30 MIN: CPT | Performed by: INTERNAL MEDICINE

## 2023-11-27 RX ORDER — NEOMYCIN SULFATE, POLYMYXIN B SULFATE, HYDROCORTISONE 3.5; 10000; 1 MG/ML; [USP'U]/ML; MG/ML
SOLUTION/ DROPS AURICULAR (OTIC)
Qty: 5 ML | Refills: 0 | Status: SHIPPED | OUTPATIENT
Start: 2023-11-27 | End: 2024-03-28 | Stop reason: ALTCHOICE

## 2023-11-28 ENCOUNTER — TELEPHONE (OUTPATIENT)
Dept: PRIMARY CARE | Facility: CLINIC | Age: 83
End: 2023-11-28
Payer: MEDICARE

## 2023-11-28 PROBLEM — Z90.79 S/P TURP: Status: ACTIVE | Noted: 2023-11-28

## 2023-11-28 PROBLEM — H61.23 EXCESSIVE EAR WAX, BILATERAL: Status: ACTIVE | Noted: 2023-11-28

## 2023-11-28 PROBLEM — Z98.890 HISTORY OF CYSTOSCOPY: Status: ACTIVE | Noted: 2023-11-28

## 2023-11-28 PROBLEM — I10 HYPERTENSION: Status: RESOLVED | Noted: 2023-09-15 | Resolved: 2023-11-28

## 2023-11-28 PROBLEM — R60.9 EDEMA: Status: RESOLVED | Noted: 2023-10-14 | Resolved: 2023-11-28

## 2023-11-28 PROBLEM — F33.9 DEPRESSION, RECURRENT (CMS-HCC): Status: RESOLVED | Noted: 2023-09-15 | Resolved: 2023-11-28

## 2023-11-28 PROBLEM — Z09 HOSPITAL DISCHARGE FOLLOW-UP: Status: ACTIVE | Noted: 2023-11-28

## 2023-11-28 PROBLEM — F41.1 GAD (GENERALIZED ANXIETY DISORDER): Status: RESOLVED | Noted: 2023-09-15 | Resolved: 2023-11-28

## 2023-11-28 PROBLEM — D62 ACUTE BLOOD LOSS AS CAUSE OF POSTOPERATIVE ANEMIA: Status: ACTIVE | Noted: 2023-11-28

## 2023-11-28 NOTE — TELEPHONE ENCOUNTER
----- Message from Annamarie Gaspar MD sent at 11/28/2023 10:07 AM EST -----  Anemia INR 2.3 therapeutic for anemia advised to see Dr. Goins EGD colonoscopy

## 2023-11-28 NOTE — PROGRESS NOTES
Subjective   Patient ID: Jordan Diaz is a 83 y.o. male who presents for Follow-up (From Prostate surgery ).    Assessment/Plan     Problem List Items Addressed This Visit       RESOLVED: Depression, recurrent (CMS/HCC)    Relevant Orders    Albumin , Urine Random    CBC and Auto Differential    Comprehensive Metabolic Panel    Hemoglobin A1C    Magnesium    Urine Culture    Microscopic Only, Urine    Uric Acid    TSH with reflex to Free T4 if abnormal    Protime-INR    Prostate Specific Antigen, Screen    RESOLVED: Hypertension    Relevant Orders    Albumin , Urine Random    CBC and Auto Differential    Comprehensive Metabolic Panel    Hemoglobin A1C    Magnesium    Urine Culture    Microscopic Only, Urine    Uric Acid    TSH with reflex to Free T4 if abnormal    Protime-INR    Prostate Specific Antigen, Screen    Hyperglycemia    Relevant Orders    Hemoglobin A1C    Stage 3b chronic kidney disease (CMS/HCC)     CKD and various stages of CKD and significance explained, CKD is very common and rising diagnosis among [place] adults. Main culprits for CKD etiology needs to be attended well. GFR values explained. Nephrotoxic agents has to be avoided, plenty of water consumption is always beneficial. Avoid NSAIDs, always check with MD about usage of OTC medications or any other Rx given by other providers. GFR less then 10 usually will need renal replacement therapy. Episodic check on renal functions needed. Always ask MD about GFR at next visit. Avoid dehydration.           Relevant Orders    Albumin , Urine Random    CBC and Auto Differential    Comprehensive Metabolic Panel    Hemoglobin A1C    Magnesium    Urine Culture    Microscopic Only, Urine    Uric Acid    TSH with reflex to Free T4 if abnormal    Protime-INR    Prostate Specific Antigen, Screen    S/P TURP    History of cystoscopy    Acute blood loss as cause of postoperative anemia     Seen by urologist oncologist continue iron B12 folic acid supplement          Excessive ear wax, bilateral     Cortisporin eardrops come for wax removal         Hospital discharge follow-up - Primary     Other Visit Diagnoses       Iron deficiency anemia due to chronic blood loss        Relevant Orders    Albumin , Urine Random    CBC and Auto Differential    Comprehensive Metabolic Panel    Hemoglobin A1C    Magnesium    Urine Culture    Microscopic Only, Urine    Uric Acid    TSH with reflex to Free T4 if abnormal    Protime-INR    Prostate Specific Antigen, Screen    Benign prostatic hyperplasia without lower urinary tract symptoms        Relevant Orders    Prostate Specific Antigen, Screen    Ear infection        Relevant Medications    neomycin-polymyxin-HC (Cortisporin) otic solution            HPI  Admitted to the [facility] October 25, 2023 discharged home on November 2, 2023    88-year-old patient who had history of BPH urinary retention Brar catheter placement past medical history of pulmonary emboli GI bleeding anemia anxiety depression hypertension hyperlipidemia malignant melanoma of the neck neuropathy phlebitis history of herniated disc surgery history of DVT pneumonia pulmonary emboli severe iron deficiency anemia treated with oxygen IV Venofer October 31, 2023 had repeat urine retention chronic Brar catheter had a cystoscopy urethral dilatation transurethral injection of the prostate.  Discussed with urology oncology continue have cough congestion ear pain deafness    PSA 0.8 H&H 10.6 and 30.9 platelet 394 potassium 5.1 INR 2.3  Past Medical History:   Diagnosis Date    Acute upper respiratory infection, unspecified 10/24/2019    Acute upper respiratory infection    Anxiety disorder, unspecified     Anxiety    Disorder of prostate, unspecified     Prostate disease    Dorsalgia, unspecified 11/28/2017    Dorsodynia    Encounter for screening for malignant neoplasm of colon 10/24/2019    Colon cancer screening    Encounter for screening for malignant neoplasm of  rectum 10/24/2019    Screening for rectal cancer    Hyperosmolality and hypernatremia 06/14/2018    Hypernatremia    Impacted cerumen, bilateral 07/26/2017    Excessive ear wax, bilateral    Other conditions influencing health status 12/28/2016    History of cough    Other disorders of phosphorus metabolism 10/24/2016    Low phosphate levels    Pain in right finger(s) 06/14/2018    Pain of right thumb    Personal history of diseases of the skin and subcutaneous tissue 02/12/2015    History of folliculitis    Personal history of diseases of the skin and subcutaneous tissue 02/28/2020    History of eczema    Personal history of other diseases of the circulatory system     History of hypertension    Personal history of other diseases of the digestive system 08/02/2021    History of gastroesophageal reflux (GERD)    Personal history of other diseases of the musculoskeletal system and connective tissue     Personal history of arthritis    Personal history of other diseases of the nervous system and sense organs 07/26/2017    History of ear pain    Personal history of other diseases of the nervous system and sense organs 07/26/2017    History of impacted cerumen    Personal history of other diseases of the respiratory system 11/10/2016    History of allergic rhinitis    Personal history of other endocrine, nutritional and metabolic disease     History of hyperlipidemia    Personal history of other endocrine, nutritional and metabolic disease 02/28/2020    History of hyperkalemia    Personal history of other endocrine, nutritional and metabolic disease 07/27/2016    History of dehydration    Personal history of other endocrine, nutritional and metabolic disease 07/25/2016    History of dehydration    Personal history of other infectious and parasitic diseases     History of varicella    Personal history of other mental and behavioral disorders 03/19/2014    History of depression    Personal history of other specified  conditions     History of edema    Personal history of other specified conditions 03/07/2014    History of edema    Personal history of other venous thrombosis and embolism     H/O blood clots    Personal history of pulmonary embolism     Personal history of pulmonary embolism    Personal history of urinary (tract) infections 12/08/2015    History of urinary tract infection    Trigger finger, right ring finger 02/28/2020    Trigger ring finger of right hand     Past Surgical History:   Procedure Laterality Date    HERNIA REPAIR       Allergies   Allergen Reactions    Animal Dander Unknown     Current Outpatient Medications   Medication Sig Dispense Refill    aspirin 81 mg EC tablet Take 1 tablet (81 mg) by mouth once daily.      cholecalciferol (Vitamin D-3) 50 MCG (2000 UT) tablet Take 1 tablet (50 mcg) by mouth once daily.      finasteride (Proscar) 5 mg tablet 1 tablet (5 mg).      latanoprost (Xalatan) 0.005 % ophthalmic solution INSTILL 1 DROP INTO LEFT EYE AT BEDTIME      multivitamin capsule Take 1 capsule by mouth once daily.      simvastatin (Zocor) 40 mg tablet TAKE 1 TABLET (40 MG) BY MOUTH ONCE DAILY AT BEDTIME. 90 tablet 0    tamsulosin (Flomax) 0.4 mg 24 hr capsule Take 2 capsules (0.8 mg) by mouth once daily.      warfarin (Coumadin) 5 mg tablet TAKE 1 TABLET EVERY DAY AND A 1/2 ON WEDNESDAY AND SUNDAY 90 tablet 3    neomycin-polymyxin-HC (Cortisporin) otic solution Instill three drops three times a day for 3 days 5 mL 0     Current Facility-Administered Medications   Medication Dose Route Frequency Provider Last Rate Last Admin    furosemide (Lasix) tablet 60 mg  60 mg oral Once Annamarie Gaspar MD         Family History   Problem Relation Name Age of Onset    Diabetes Mother      Heart failure Mother      Heart failure Father       Social History     Socioeconomic History    Marital status:      Spouse name: None    Number of children: None    Years of education: None    Highest education  "level: None   Occupational History    None   Tobacco Use    Smoking status: Never    Smokeless tobacco: Never   Vaping Use    Vaping Use: Never used   Substance and Sexual Activity    Alcohol use: Never    Drug use: Never    Sexual activity: None   Other Topics Concern    None   Social History Narrative    None     Social Determinants of Health     Financial Resource Strain: Not on file   Food Insecurity: Not on file   Transportation Needs: Not on file   Physical Activity: Not on file   Stress: Not on file   Social Connections: Not on file   Intimate Partner Violence: Not on file   Housing Stability: Not on file     Immunization History   Administered Date(s) Administered    Pfizer Purple Cap SARS-CoV-2 04/16/2021, 05/07/2021    Pneumococcal conjugate vaccine, 13-valent (PREVNAR 13) 04/27/2016    Pneumococcal polysaccharide vaccine, 23-valent, age 2 years and older (PNEUMOVAX 23) 03/12/2012    Tdap vaccine, age 7 year and older (BOOSTRIX) 08/01/2011    Zoster vaccine, recombinant, adult (SHINGRIX) 07/26/2019, 02/28/2020    Zoster, live 06/01/2016       Review of Systems  Review of systems is otherwise negative unless stated above or in history of present illness.    Objective   Visit Vitals  /68 (BP Location: Left arm, Patient Position: Sitting, BP Cuff Size: Adult)   Pulse 63   Temp 36.1 °C (97 °F)   Ht 1.702 m (5' 7\")   Wt 63 kg (139 lb)   SpO2 98%   BMI 21.77 kg/m²   Smoking Status Never   BSA 1.73 m²     Physical Exam  Constitutional: BMI 21     General: not in acute distress.   HENT: Both ear Canal filled with a brown wax with the deafness     Head: Normocephalic and atraumatic.      Nose: Nose normal.   Eyes:      Extraocular Movements: Extraocular movements intact.      Conjunctiva/sclera: Conjunctivae normal.   Cardiovascular: Heart murmur     Rate and Rhythm: Normal rate ,  No M/R/G  Pulmonary: Crackle     Effort: Pulmonary effort is normal.      Breath sounds: Normal, Bilat Equal AE  Skin:     " General: Skin is warm.   Neurological: Neuralgia arthritis     Mental Status: He is alert and oriented to person, place, and time.   Psychiatric:         Mood and Affect: Mood normal.         Behavior: Behavior normal.   Musculoskeletal INR 2.3  FROM in all extremitirs,  Joint-no swelling or tenderness  Platelet 535 hemoglobin 10.8 hematocrit 31.8 advised follow-up with the Coumadin clinic oncology urologist  Orders Only on 11/27/2023   Component Date Value Ref Range Status    [facility] Protime Patient 11/27/2023 26.0 (H)  9.8 - 12.8 seconds Final    [facility] INR 11/27/2023 2.3   Final    [facility] HGB A1C 11/27/2023 5.1  % Final    [facility] Prostatic Specific Ag S* 11/27/2023 0.8  0.0 - 4.0 ng/mL Final    [facility] Magnesium 11/27/2023 2.1  1.6 - 2.6 mg/dL Final    [facility] Uric Acid 11/27/2023 7.6  3.7 - 9.2 mg/dL Final    [facility] GPT 11/27/2023 13  10 - 49 unit/L Final    [facility] Creatinine 11/27/2023 1.1  0.6 - 1.1 mg/dL Final    [facility] Alk Phos 11/27/2023 92  45 - 117 unit/L Final    [facility] Total Protein 11/27/2023 6.4  5.7 - 8.2 g/dL Final    [facility] CO2, venous 11/27/2023 30.0  20.0 - 31.0 mmol/L Final    [facility] Glomerular Filtration R* 11/27/2023 >60  mL/min/1.73m? Final    [facility] Calculated Osmolality 11/27/2023 289  275 - 295 mOsm/kg Final    [facility] K 11/27/2023 5.1  3.5 - 5.1 mmol/L Final    [facility] Globulin 11/27/2023 2.9  g/dL Final    [facility] BUN 11/27/2023 17  9 - 23 mg/dL Final    [facility] Calcium 11/27/2023 9.0  8.7 - 10.4 mg/dL Final    [facility] GOT 11/27/2023 22  15 - 37 unit/L Final    [facility] Glucose 11/27/2023 100  74 - 106 mg/dL Final    [facility] ALB 11/27/2023 3.5  3.4 - 5.0 g/dL Final    [facility] GFR AA 11/27/2023 >60   Final    [facility] Bilirubin, Total 11/27/2023 0.30  0.30 - 1.20 mg/dL Final    [facility] Chloride 11/27/2023 110 (H)  98 - 107 mmol/L Final    [facility] A/G Ratio 11/27/2023 1.2   Final    [facility] Na 11/27/2023 144   135 - 145 mmol/L Final    [facility] BUN/Creat Ratio 11/27/2023 15.5   Final    [facility] TSH 11/27/2023 1.21  0.55 - 4.78 uIU/ml Final    [facility] HCT 11/27/2023 34.0 (L)  41.0 - 52.0 % Final    [facility] RBC 11/27/2023 4.03 (L)  4.70 - 6.10 x10 Final    [facility] Platelet 11/27/2023 394  150 - 450 x10 Final    [facility] Instr WBC 11/27/2023 6.9   Final    [facility] MCHC 11/27/2023 31.8 (L)  32.0 - 37.0 g/dL Final    [facility] DIFF? 11/27/2023 No   Final    [facility] MCV 11/27/2023 84.5  80.0 - 100.0 fL Final    [facility] MPV 11/27/2023 7.1 (L)  7.4 - 10.4 fL Final    [facility] HGB 11/27/2023 10.8 (L)  13.5 - 17.5 g/dL Final    [facility] WBC 11/27/2023 6.9  4.5 - 11.0 x10 Final    [facility] RDW 11/27/2023 16.7 (H)  11.5 - 14.5 % Final    [facility] DxH Actions 11/27/2023 See Notes (A)   Final    [facility] MCH 11/27/2023 26.8 (L)  27.0 - 34.0 pg Final    [facility] Nucleated RBC 11/27/2023 0  /100WBC Final    [facility] Lymph Count 11/27/2023 1.24  1.20 - 4.80 x1000 Final    [facility] Scan Differential 11/27/2023 Diff Scd   Final    [facility] Basos % 11/27/2023 0.4  % Final    [facility] Baso Count 11/27/2023 0.03  0.00 - 0.20 x1000 Final    [facility] Mono % 11/27/2023 7.3  % Final    [facility] Neutrophil % 11/27/2023 71.8  % Final    [facility] Mono Count 11/27/2023 0.50  0.10 - 1.00 x1000 Final    [facility] Neutrophil Count (ANC) 11/27/2023 4.96  1.40 - 8.80 x1000 Final    [facility] Red Blood Cell Morpholo* 11/27/2023 See Notes (A)   Final    [facility] Eosin % 11/27/2023 2.7  % Final    [facility] Lymph % 11/27/2023 17.9  % Final    [facility] Eos Count 11/27/2023 0.19  0.00 - 0.50 x1000 Final   Orders Only on 10/13/2023   Component Date Value Ref Range Status    [facility] Nucleated RBC 10/13/2023 0  /100WBC Final    [facility] HCT 10/13/2023 30.9 (L)  41.0 - 52.0 % Final    [facility] Platelet 10/13/2023 535 (H)  150 - 450 x10 Final    [facility] RBC 10/13/2023 3.56 (L)  4.70 - 6.10 x10  Final    [facility] DIFF? 10/13/2023 No   Final    [facility] Instr WBC 10/13/2023 8.5   Final    [facility] MCHC 10/13/2023 34.3  32.0 - 37.0 g/dL Final    [facility] MCV 10/13/2023 86.8  80.0 - 100.0 fL Final    [facility] HGB 10/13/2023 10.6 (L)  13.5 - 17.5 g/dL Final    [facility] MPV 10/13/2023 6.6 (L)  7.4 - 10.4 fL Final    [facility] WBC 10/13/2023 8.5  4.5 - 11.0 x10 Final    [facility] RDW 10/13/2023 14.1  11.5 - 14.5 % Final    [facility] MCH 10/13/2023 29.8  27.0 - 34.0 pg Final    [facility] Basos % 10/13/2023 0.6  % Final    [facility] Baso Count 10/13/2023 0.05  0.00 - 0.20 x1000 Final    [facility] Mono Count 10/13/2023 0.70  0.10 - 1.00 x1000 Final    [facility] Mono % 10/13/2023 8.2  % Final    [facility] Neutrophil % 10/13/2023 77.4  % Final    [facility] Neutrophil Count (ANC) 10/13/2023 6.58  1.40 - 8.80 x1000 Final    [facility] Eosin % 10/13/2023 0.7  % Final    [facility] Eos Count 10/13/2023 0.06  0.00 - 0.50 x1000 Final    [facility] Lymph Count 10/13/2023 1.11 (L)  1.20 - 4.80 x1000 Final    [facility] Lymph % 10/13/2023 13.1  % Final    [facility] HGB A1C 10/13/2023 5.8  % Final    [facility] Prostatic Specific Anti* 10/13/2023 4.5 (H)  0.0 - 4.0 ng/mL Final    [facility] Bilirubin, Total 10/13/2023 0.50  0.20 - 1.00 mg/dL Final    [facility] GFR AA 10/13/2023 >60   Final    [facility] Chloride 10/13/2023 107  98 - 107 mmol/L Final    [facility] A/G Ratio 10/13/2023 1.2   Final    [facility] Na 10/13/2023 140  135 - 145 mmol/L Final    [facility] BUN/Creat Ratio 10/13/2023 22.0   Final    [facility] Creatinine 10/13/2023 1.0  0.6 - 1.1 mg/dL Final    [facility] Alk Phos 10/13/2023 72  46 - 116 unit/L Final    [facility] GPT 10/13/2023 36  10 - 49 unit/L Final    [facility] CO2, venous 10/13/2023 30.0  20.0 - 31.0 mmol/L Final    [facility] Total Protein 10/13/2023 5.8  5.7 - 8.2 g/dL Final    [facility] Calculated Osmolality 10/13/2023 283  275 - 295 mOsm/kg Final    [facility]  Glomerular Filtration R* 10/13/2023 >60  mL/min/1.73m? Final    [facility] K 10/13/2023 4.5  3.5 - 5.1 mmol/L Final    [facility] Globulin 10/13/2023 2.6  g/dL Final    [facility] Calcium 10/13/2023 8.8  8.7 - 10.4 mg/dL Final    [facility] BUN 10/13/2023 22  9 - 23 mg/dL Final    [facility] Glucose 10/13/2023 95  74 - 106 mg/dL Final    [facility] GOT 10/13/2023 37  15 - 37 unit/L Final    [facility] ALB 10/13/2023 3.2 (L)  3.4 - 5.0 g/dL Final    [facility] Triglycerides 10/13/2023 156 (H)  30 - 150 mg/dL Final    [facility] Cholesterol 10/13/2023 140  100 - 200 mg/dL Final    [facility] Calculated LDL [place]* 10/13/2023 69  60 - 130 mg/dL Final    [facility] HDL Cholesterol 10/13/2023 40  40 - 60 mg/dL Final    [facility] Total Chol/HDL Chol Rat* 10/13/2023 3.5   Final    [facility] B-type Natriuretic Pept* 10/13/2023 15  0 - 100 pg/mL Final    [facility] TSH 10/13/2023 1.21  0.55 - 4.78 uIU/ml Final    [facility] Vit D 25 10/13/2023 87  ng/mL Final    [facility] Protime Patient 10/13/2023 84.7 (H)  9.8 - 12.8 seconds Final    [facility] INR 10/13/2023 7.3 (AA)   Final       Radiology: Reviewed imaging in powerchart.  No results found.      Charting was completed using voice recognition technology and may include unintended errors.

## 2023-11-28 NOTE — ASSESSMENT & PLAN NOTE
CKD and various stages of CKD and significance explained, CKD is very common and rising diagnosis among [place] adults. Main culprits for CKD etiology needs to be attended well. GFR values explained. Nephrotoxic agents has to be avoided, plenty of water consumption is always beneficial. Avoid NSAIDs, always check with MD about usage of OTC medications or any other Rx given by other providers. GFR less then 10 usually will need renal replacement therapy. Episodic check on renal functions needed. Always ask MD about GFR at next visit. Avoid dehydration.     no

## 2023-11-30 ENCOUNTER — APPOINTMENT (OUTPATIENT)
Dept: PRIMARY CARE | Facility: CLINIC | Age: 83
End: 2023-11-30
Payer: MEDICARE

## 2023-12-01 ENCOUNTER — OFFICE VISIT (OUTPATIENT)
Dept: PRIMARY CARE | Facility: CLINIC | Age: 83
End: 2023-12-01
Payer: MEDICARE

## 2023-12-01 VITALS
SYSTOLIC BLOOD PRESSURE: 144 MMHG | OXYGEN SATURATION: 97 % | WEIGHT: 139 LBS | DIASTOLIC BLOOD PRESSURE: 64 MMHG | BODY MASS INDEX: 21.82 KG/M2 | TEMPERATURE: 97.3 F | HEIGHT: 67 IN | HEART RATE: 68 BPM

## 2023-12-01 DIAGNOSIS — D62 ACUTE BLOOD LOSS AS CAUSE OF POSTOPERATIVE ANEMIA: ICD-10-CM

## 2023-12-01 DIAGNOSIS — Z90.79 S/P TURP: ICD-10-CM

## 2023-12-01 DIAGNOSIS — Z98.890 HISTORY OF CYSTOSCOPY: ICD-10-CM

## 2023-12-01 DIAGNOSIS — H61.23 EXCESSIVE EAR WAX, BILATERAL: Primary | ICD-10-CM

## 2023-12-01 DIAGNOSIS — N18.32 STAGE 3B CHRONIC KIDNEY DISEASE (MULTI): ICD-10-CM

## 2023-12-01 PROBLEM — Z09 HOSPITAL DISCHARGE FOLLOW-UP: Status: RESOLVED | Noted: 2023-11-28 | Resolved: 2023-12-01

## 2023-12-01 PROBLEM — R73.9 HYPERGLYCEMIA: Status: RESOLVED | Noted: 2023-09-15 | Resolved: 2023-12-01

## 2023-12-01 PROBLEM — M81.8 OSTEOPOROSIS, IDIOPATHIC: Status: RESOLVED | Noted: 2023-09-15 | Resolved: 2023-12-01

## 2023-12-01 PROBLEM — N30.00 ACUTE CYSTITIS WITHOUT HEMATURIA: Status: RESOLVED | Noted: 2023-09-15 | Resolved: 2023-12-01

## 2023-12-01 PROCEDURE — 1159F MED LIST DOCD IN RCRD: CPT | Performed by: INTERNAL MEDICINE

## 2023-12-01 PROCEDURE — 1160F RVW MEDS BY RX/DR IN RCRD: CPT | Performed by: INTERNAL MEDICINE

## 2023-12-01 PROCEDURE — 99213 OFFICE O/P EST LOW 20 MIN: CPT | Performed by: INTERNAL MEDICINE

## 2023-12-01 PROCEDURE — 69210 REMOVE IMPACTED EAR WAX UNI: CPT | Performed by: INTERNAL MEDICINE

## 2023-12-01 PROCEDURE — 1036F TOBACCO NON-USER: CPT | Performed by: INTERNAL MEDICINE

## 2023-12-01 RX ORDER — FERROUS SULFATE 325(65) MG
325 TABLET ORAL
COMMUNITY

## 2023-12-01 NOTE — PROGRESS NOTES
Subjective   Patient ID: Jordan Diaz is a 83 y.o. male who presents for Cerumen Impaction (Bilateral /Cleaning ).    Assessment/Plan     Problem List Items Addressed This Visit       Stage 3b chronic kidney disease (CMS/HCC)    S/P TURP    RESOLVED: History of cystoscopy    Acute blood loss as cause of postoperative anemia    Bilateral impacted cerumen - Primary     Warm water irrigation hydrogen peroxide curette wax was removed patient hearing much much better          Patient was evaluated today, problem list was reviewed, problems and concerns addressed, Rx list reviewed and updated, lab and tests were noted and reviewed. Life style changes were discussed, always it works better if we eat plant based diet and plenty of fibres and roughage. Consume adequate amount of water and avoid alcohol, light to moderate physical activities and stress reduction are always beneficial for ongoing physical well being. Do not forget to have 6 to 7 hours of sleep regularly and avoid late night tomeka screen exposure.      HPI\    This is a 83-year-old patient have anemia glaucoma hypertension hyperlipidemia TURP chronic heart disease pulmonary emboli on anticoagulation    Complaining of the bilateral vacs with the deafness with aseptic antiseptic precaution warm water curette irrigation wax was removed    Anemia iron B12 folic acid    Glaucoma eyedrop    Hyperlipidemia simvastatin    Pulmonary emboli Coumadin    Advise follow-up with the urologist and oncologist for anemia and prostate problem  Past Medical History:   Diagnosis Date    Acute upper respiratory infection, unspecified 10/24/2019    Acute upper respiratory infection    Anxiety disorder, unspecified     Anxiety    Disorder of prostate, unspecified     Prostate disease    Dorsalgia, unspecified 11/28/2017    Dorsodynia    Encounter for screening for malignant neoplasm of colon 10/24/2019    Colon cancer screening    Encounter for screening for malignant neoplasm of  rectum 10/24/2019    Screening for rectal cancer    Hyperosmolality and hypernatremia 06/14/2018    Hypernatremia    Impacted cerumen, bilateral 07/26/2017    Excessive ear wax, bilateral    Other conditions influencing health status 12/28/2016    History of cough    Other disorders of phosphorus metabolism 10/24/2016    Low phosphate levels    Pain in right finger(s) 06/14/2018    Pain of right thumb    Personal history of diseases of the skin and subcutaneous tissue 02/12/2015    History of folliculitis    Personal history of diseases of the skin and subcutaneous tissue 02/28/2020    History of eczema    Personal history of other diseases of the circulatory system     History of hypertension    Personal history of other diseases of the digestive system 08/02/2021    History of gastroesophageal reflux (GERD)    Personal history of other diseases of the musculoskeletal system and connective tissue     Personal history of arthritis    Personal history of other diseases of the nervous system and sense organs 07/26/2017    History of ear pain    Personal history of other diseases of the nervous system and sense organs 07/26/2017    History of impacted cerumen    Personal history of other diseases of the respiratory system 11/10/2016    History of allergic rhinitis    Personal history of other endocrine, nutritional and metabolic disease     History of hyperlipidemia    Personal history of other endocrine, nutritional and metabolic disease 02/28/2020    History of hyperkalemia    Personal history of other endocrine, nutritional and metabolic disease 07/27/2016    History of dehydration    Personal history of other endocrine, nutritional and metabolic disease 07/25/2016    History of dehydration    Personal history of other infectious and parasitic diseases     History of varicella    Personal history of other mental and behavioral disorders 03/19/2014    History of depression    Personal history of other specified  conditions     History of edema    Personal history of other specified conditions 03/07/2014    History of edema    Personal history of other venous thrombosis and embolism     H/O blood clots    Personal history of pulmonary embolism     Personal history of pulmonary embolism    Personal history of urinary (tract) infections 12/08/2015    History of urinary tract infection    Trigger finger, right ring finger 02/28/2020    Trigger ring finger of right hand     Past Surgical History:   Procedure Laterality Date    HERNIA REPAIR       Allergies   Allergen Reactions    Animal Dander Unknown     Current Outpatient Medications   Medication Sig Dispense Refill    ferrous sulfate, 325 mg ferrous sulfate, tablet Take 1 tablet by mouth once daily with a meal.      latanoprost (Xalatan) 0.005 % ophthalmic solution INSTILL 1 DROP INTO LEFT EYE AT BEDTIME      neomycin-polymyxin-HC (Cortisporin) otic solution Instill three drops three times a day for 3 days 5 mL 0    simvastatin (Zocor) 40 mg tablet TAKE 1 TABLET (40 MG) BY MOUTH ONCE DAILY AT BEDTIME. 90 tablet 0    warfarin (Coumadin) 5 mg tablet TAKE 1 TABLET EVERY DAY AND A 1/2 ON WEDNESDAY AND SUNDAY 90 tablet 3     No current facility-administered medications for this visit.     Family History   Problem Relation Name Age of Onset    Diabetes Mother      Heart failure Mother      Heart failure Father       Social History     Socioeconomic History    Marital status:      Spouse name: None    Number of children: None    Years of education: None    Highest education level: None   Occupational History    None   Tobacco Use    Smoking status: Never    Smokeless tobacco: Never   Vaping Use    Vaping Use: Never used   Substance and Sexual Activity    Alcohol use: Never    Drug use: Never    Sexual activity: None   Other Topics Concern    None   Social History Narrative    None     Social Determinants of Health     Financial Resource Strain: Not on file   Food Insecurity:  "Not on file   Transportation Needs: Not on file   Physical Activity: Not on file   Stress: Not on file   Social Connections: Not on file   Intimate Partner Violence: Not on file   Housing Stability: Not on file     Immunization History   Administered Date(s) Administered    Pfizer Purple Cap SARS-CoV-2 04/16/2021, 05/07/2021    Pneumococcal conjugate vaccine, 13-valent (PREVNAR 13) 04/27/2016    Pneumococcal polysaccharide vaccine, 23-valent, age 2 years and older (PNEUMOVAX 23) 03/12/2012    Tdap vaccine, age 7 year and older (BOOSTRIX) 08/01/2011    Zoster vaccine, recombinant, adult (SHINGRIX) 07/26/2019, 02/28/2020    Zoster, live 06/01/2016       Review of Systems  Review of systems is otherwise negative unless stated above or in history of present illness.    Objective   Visit Vitals  /64 (BP Location: Left arm, Patient Position: Sitting, BP Cuff Size: Adult)   Pulse 68   Temp 36.3 °C (97.3 °F)   Ht 1.702 m (5' 7\")   Wt 63 kg (139 lb)   SpO2 97%   BMI 21.77 kg/m²   Smoking Status Never   BSA 1.73 m²     Physical Exam  Constitutional: BMI 21     General: not in acute distress.   HENT: Bilateral vacs     Head: Normocephalic and atraumatic.      Nose: Nose normal.   Eyes:      Extraocular Movements: Extraocular movements intact.      Conjunctiva/sclera: Conjunctivae normal.   Cardiovascular: Heart murmur     Rate and Rhythm: Normal rate ,  No M/R/G  Pulmonary:      Effort: Pulmonary effort is normal.      Breath sounds: Normal, Bilat Equal AE  Skin:     General: Skin is warm.   Neurological: Neuralgia     Mental Status: He is alert and oriented to person, place, and time.   Psychiatric:    Anxiety     Mood and Affect: Mood normal.         Behavior: Behavior normal.   Musculoskeletal   FROM in all extremitirs,  Joint-no swelling or tenderness  Anemia  Orders Only on 11/27/2023   Component Date Value Ref Range Status    [facility] Protime Patient 11/27/2023 26.0 (H)  9.8 - 12.8 seconds Final    [facility] INR " 11/27/2023 2.3   Final    [facility] HGB A1C 11/27/2023 5.1  % Final    [facility] Prostatic Specific Ag S* 11/27/2023 0.8  0.0 - 4.0 ng/mL Final    [facility] Magnesium 11/27/2023 2.1  1.6 - 2.6 mg/dL Final    [facility] Uric Acid 11/27/2023 7.6  3.7 - 9.2 mg/dL Final    [facility] GPT 11/27/2023 13  10 - 49 unit/L Final    [facility] Creatinine 11/27/2023 1.1  0.6 - 1.1 mg/dL Final    [facility] Alk Phos 11/27/2023 92  45 - 117 unit/L Final    [facility] Total Protein 11/27/2023 6.4  5.7 - 8.2 g/dL Final    [facility] CO2, venous 11/27/2023 30.0  20.0 - 31.0 mmol/L Final    [facility] Glomerular Filtration R* 11/27/2023 >60  mL/min/1.73m? Final    [facility] Calculated Osmolality 11/27/2023 289  275 - 295 mOsm/kg Final    [facility] K 11/27/2023 5.1  3.5 - 5.1 mmol/L Final    [facility] Globulin 11/27/2023 2.9  g/dL Final    [facility] BUN 11/27/2023 17  9 - 23 mg/dL Final    [facility] Calcium 11/27/2023 9.0  8.7 - 10.4 mg/dL Final    [facility] GOT 11/27/2023 22  15 - 37 unit/L Final    [facility] Glucose 11/27/2023 100  74 - 106 mg/dL Final    [facility] ALB 11/27/2023 3.5  3.4 - 5.0 g/dL Final    [facility] GFR AA 11/27/2023 >60   Final    [facility] Bilirubin, Total 11/27/2023 0.30  0.30 - 1.20 mg/dL Final    [facility] Chloride 11/27/2023 110 (H)  98 - 107 mmol/L Final    [facility] A/G Ratio 11/27/2023 1.2   Final    [facility] Na 11/27/2023 144  135 - 145 mmol/L Final    [facility] BUN/Creat Ratio 11/27/2023 15.5   Final    [facility] TSH 11/27/2023 1.21  0.55 - 4.78 uIU/ml Final    [facility] HCT 11/27/2023 34.0 (L)  41.0 - 52.0 % Final    [facility] RBC 11/27/2023 4.03 (L)  4.70 - 6.10 x10 Final    [facility] Platelet 11/27/2023 394  150 - 450 x10 Final    [facility] Instr WBC 11/27/2023 6.9   Final    [facility] MCHC 11/27/2023 31.8 (L)  32.0 - 37.0 g/dL Final    [facility] DIFF? 11/27/2023 No   Final    [facility] MCV 11/27/2023 84.5  80.0 - 100.0 fL Final    [facility] MPV 11/27/2023 7.1 (L)  7.4 -  10.4 fL Final    [facility] HGB 11/27/2023 10.8 (L)  13.5 - 17.5 g/dL Final    [facility] WBC 11/27/2023 6.9  4.5 - 11.0 x10 Final    [facility] RDW 11/27/2023 16.7 (H)  11.5 - 14.5 % Final    [facility] DxH Actions 11/27/2023 See Notes (A)   Final    [facility] MCH 11/27/2023 26.8 (L)  27.0 - 34.0 pg Final    [facility] Nucleated RBC 11/27/2023 0  /100WBC Final    [facility] Lymph Count 11/27/2023 1.24  1.20 - 4.80 x1000 Final    [facility] Scan Differential 11/27/2023 Diff Scd   Final    [facility] Basos % 11/27/2023 0.4  % Final    [facility] Baso Count 11/27/2023 0.03  0.00 - 0.20 x1000 Final    [facility] Mono % 11/27/2023 7.3  % Final    [facility] Neutrophil % 11/27/2023 71.8  % Final    [facility] Mono Count 11/27/2023 0.50  0.10 - 1.00 x1000 Final    [facility] Neutrophil Count (ANC) 11/27/2023 4.96  1.40 - 8.80 x1000 Final    [facility] Red Blood Cell Morpholo* 11/27/2023 See Notes (A)   Final    [facility] Eosin % 11/27/2023 2.7  % Final    [facility] Lymph % 11/27/2023 17.9  % Final    [facility] Eos Count 11/27/2023 0.19  0.00 - 0.50 x1000 Final   Orders Only on 10/13/2023   Component Date Value Ref Range Status    [facility] Nucleated RBC 10/13/2023 0  /100WBC Final    [facility] HCT 10/13/2023 30.9 (L)  41.0 - 52.0 % Final    [facility] Platelet 10/13/2023 535 (H)  150 - 450 x10 Final    [facility] RBC 10/13/2023 3.56 (L)  4.70 - 6.10 x10 Final    [facility] DIFF? 10/13/2023 No   Final    [facility] Instr WBC 10/13/2023 8.5   Final    [facility] MCHC 10/13/2023 34.3  32.0 - 37.0 g/dL Final    [facility] MCV 10/13/2023 86.8  80.0 - 100.0 fL Final    [facility] HGB 10/13/2023 10.6 (L)  13.5 - 17.5 g/dL Final    [facility] MPV 10/13/2023 6.6 (L)  7.4 - 10.4 fL Final    [facility] WBC 10/13/2023 8.5  4.5 - 11.0 x10 Final    [facility] RDW 10/13/2023 14.1  11.5 - 14.5 % Final    [facility] MCH 10/13/2023 29.8  27.0 - 34.0 pg Final    [facility] Basos % 10/13/2023 0.6  % Final    [facility] Baso Count  10/13/2023 0.05  0.00 - 0.20 x1000 Final    [facility] Mono Count 10/13/2023 0.70  0.10 - 1.00 x1000 Final    [facility] Mono % 10/13/2023 8.2  % Final    [facility] Neutrophil % 10/13/2023 77.4  % Final    [facility] Neutrophil Count (ANC) 10/13/2023 6.58  1.40 - 8.80 x1000 Final    [facility] Eosin % 10/13/2023 0.7  % Final    [facility] Eos Count 10/13/2023 0.06  0.00 - 0.50 x1000 Final    [facility] Lymph Count 10/13/2023 1.11 (L)  1.20 - 4.80 x1000 Final    [facility] Lymph % 10/13/2023 13.1  % Final    [facility] HGB A1C 10/13/2023 5.8  % Final    [facility] Prostatic Specific Anti* 10/13/2023 4.5 (H)  0.0 - 4.0 ng/mL Final    [facility] Bilirubin, Total 10/13/2023 0.50  0.20 - 1.00 mg/dL Final    [facility] GFR AA 10/13/2023 >60   Final    [facility] Chloride 10/13/2023 107  98 - 107 mmol/L Final    [facility] A/G Ratio 10/13/2023 1.2   Final    [facility] Na 10/13/2023 140  135 - 145 mmol/L Final    [facility] BUN/Creat Ratio 10/13/2023 22.0   Final    [facility] Creatinine 10/13/2023 1.0  0.6 - 1.1 mg/dL Final    [facility] Alk Phos 10/13/2023 72  46 - 116 unit/L Final    [facility] GPT 10/13/2023 36  10 - 49 unit/L Final    [facility] CO2, venous 10/13/2023 30.0  20.0 - 31.0 mmol/L Final    [facility] Total Protein 10/13/2023 5.8  5.7 - 8.2 g/dL Final    [facility] Calculated Osmolality 10/13/2023 283  275 - 295 mOsm/kg Final    [facility] Glomerular Filtration R* 10/13/2023 >60  mL/min/1.73m? Final    [facility] K 10/13/2023 4.5  3.5 - 5.1 mmol/L Final    [facility] Globulin 10/13/2023 2.6  g/dL Final    [facility] Calcium 10/13/2023 8.8  8.7 - 10.4 mg/dL Final    [facility] BUN 10/13/2023 22  9 - 23 mg/dL Final    [facility] Glucose 10/13/2023 95  74 - 106 mg/dL Final    [facility] GOT 10/13/2023 37  15 - 37 unit/L Final    [facility] ALB 10/13/2023 3.2 (L)  3.4 - 5.0 g/dL Final    [facility] Triglycerides 10/13/2023 156 (H)  30 - 150 mg/dL Final    [facility] Cholesterol 10/13/2023 140  100 - 200 mg/dL  Final    [facility] Calculated LDL [place]* 10/13/2023 69  60 - 130 mg/dL Final    [facility] HDL Cholesterol 10/13/2023 40  40 - 60 mg/dL Final    [facility] Total Chol/HDL Chol Rat* 10/13/2023 3.5   Final    [facility] B-type Natriuretic Pept* 10/13/2023 15  0 - 100 pg/mL Final    [facility] TSH 10/13/2023 1.21  0.55 - 4.78 uIU/ml Final    [facility] Vit D 25 10/13/2023 87  ng/mL Final    [facility] Protime Patient 10/13/2023 84.7 (H)  9.8 - 12.8 seconds Final    [facility] INR 10/13/2023 7.3 (AA)   Final       Radiology: Reviewed imaging in powerchart.  No results found.      Charting was completed using voice recognition technology and may include unintended errors.

## 2023-12-29 ENCOUNTER — APPOINTMENT (OUTPATIENT)
Dept: PRIMARY CARE | Facility: CLINIC | Age: 83
End: 2023-12-29
Payer: MEDICARE

## 2024-01-22 PROBLEM — I82.409 ACUTE EMBOLISM AND THROMBOSIS OF UNSPECIFIED DEEP VEINS OF UNSPECIFIED LOWER EXTREMITY (MULTI): Status: ACTIVE | Noted: 2024-01-22

## 2024-03-28 ENCOUNTER — OFFICE VISIT (OUTPATIENT)
Dept: PRIMARY CARE | Facility: CLINIC | Age: 84
End: 2024-03-28
Payer: MEDICARE

## 2024-03-28 VITALS
DIASTOLIC BLOOD PRESSURE: 71 MMHG | HEIGHT: 67 IN | WEIGHT: 144.6 LBS | BODY MASS INDEX: 22.7 KG/M2 | TEMPERATURE: 97.6 F | OXYGEN SATURATION: 98 % | SYSTOLIC BLOOD PRESSURE: 140 MMHG | HEART RATE: 80 BPM

## 2024-03-28 DIAGNOSIS — I82.409 ACUTE EMBOLISM AND THROMBOSIS OF DEEP VEIN OF LOWER EXTREMITY, UNSPECIFIED LATERALITY (MULTI): Primary | ICD-10-CM

## 2024-03-28 DIAGNOSIS — E78.2 HYPERLIPEMIA, MIXED: ICD-10-CM

## 2024-03-28 DIAGNOSIS — Z86.69 HISTORY OF GLAUCOMA: ICD-10-CM

## 2024-03-28 DIAGNOSIS — D50.0 IRON DEFICIENCY ANEMIA DUE TO CHRONIC BLOOD LOSS: ICD-10-CM

## 2024-03-28 DIAGNOSIS — I51.7 LVH (LEFT VENTRICULAR HYPERTROPHY): ICD-10-CM

## 2024-03-28 PROBLEM — H61.23 BILATERAL IMPACTED CERUMEN: Status: RESOLVED | Noted: 2023-11-28 | Resolved: 2024-03-28

## 2024-03-28 PROBLEM — N18.32 STAGE 3B CHRONIC KIDNEY DISEASE (MULTI): Status: RESOLVED | Noted: 2023-10-14 | Resolved: 2024-03-28

## 2024-03-28 PROBLEM — Z90.79 S/P TURP: Status: RESOLVED | Noted: 2023-11-28 | Resolved: 2024-03-28

## 2024-03-28 PROBLEM — D50.9 IRON DEFICIENCY ANEMIA: Status: ACTIVE | Noted: 2024-03-28

## 2024-03-28 PROBLEM — D62 ACUTE BLOOD LOSS AS CAUSE OF POSTOPERATIVE ANEMIA: Status: RESOLVED | Noted: 2023-11-28 | Resolved: 2024-03-28

## 2024-03-28 PROBLEM — I50.9 CONGESTIVE HEART FAILURE (MULTI): Status: RESOLVED | Noted: 2023-10-14 | Resolved: 2024-03-28

## 2024-03-28 PROCEDURE — 1036F TOBACCO NON-USER: CPT | Performed by: INTERNAL MEDICINE

## 2024-03-28 PROCEDURE — 1157F ADVNC CARE PLAN IN RCRD: CPT | Performed by: INTERNAL MEDICINE

## 2024-03-28 PROCEDURE — 1160F RVW MEDS BY RX/DR IN RCRD: CPT | Performed by: INTERNAL MEDICINE

## 2024-03-28 PROCEDURE — 99213 OFFICE O/P EST LOW 20 MIN: CPT | Performed by: INTERNAL MEDICINE

## 2024-03-28 PROCEDURE — 1159F MED LIST DOCD IN RCRD: CPT | Performed by: INTERNAL MEDICINE

## 2024-03-28 NOTE — PROGRESS NOTES
Subjective   Patient ID: Jordan Diaz is a 83 y.o. male who presents for Follow-up (4 month ).    Assessment/Plan     Problem List Items Addressed This Visit       Acute embolism and thrombosis of unspecified deep veins of unspecified lower extremity (CMS/HCC) - Primary     Monitor chest pain leg pain bleeding oxygen on Coumadin keep INR between 2 and 3         Relevant Orders    Transthoracic echo (TTE) complete    CBC and Auto Differential    Protime-INR    CK    Hyperlipemia, mixed    Relevant Orders    Transthoracic echo (TTE) complete    CBC and Auto Differential    Protime-INR    CK    LVH (left ventricular hypertrophy)    Relevant Orders    Transthoracic echo (TTE) complete    CBC and Auto Differential    Protime-INR    CK    Iron deficiency anemia    History of glaucoma       HPI 83-year-old patient have anemia seen by gastrology hematology workup negative    History of glaucoma hypertension hyperlipidemia associate with a history of the pulmonary emboli on Coumadin    Negative for hypoxia DVT    Negative for bleeding    Negative for headache or chest pain    Negative for dementia depression      Past Medical History:   Diagnosis Date    Acute upper respiratory infection, unspecified 10/24/2019    Acute upper respiratory infection    Anxiety disorder, unspecified     Anxiety    Disorder of prostate, unspecified     Prostate disease    Dorsalgia, unspecified 11/28/2017    Dorsodynia    Encounter for screening for malignant neoplasm of colon 10/24/2019    Colon cancer screening    Encounter for screening for malignant neoplasm of rectum 10/24/2019    Screening for rectal cancer    Hyperosmolality and hypernatremia 06/14/2018    Hypernatremia    Impacted cerumen, bilateral 07/26/2017    Excessive ear wax, bilateral    Other conditions influencing health status 12/28/2016    History of cough    Other disorders of phosphorus metabolism 10/24/2016    Low phosphate levels    Pain in right finger(s) 06/14/2018     Pain of right thumb    Personal history of diseases of the skin and subcutaneous tissue 02/12/2015    History of folliculitis    Personal history of diseases of the skin and subcutaneous tissue 02/28/2020    History of eczema    Personal history of other diseases of the circulatory system     History of hypertension    Personal history of other diseases of the digestive system 08/02/2021    History of gastroesophageal reflux (GERD)    Personal history of other diseases of the musculoskeletal system and connective tissue     Personal history of arthritis    Personal history of other diseases of the nervous system and sense organs 07/26/2017    History of ear pain    Personal history of other diseases of the nervous system and sense organs 07/26/2017    History of impacted cerumen    Personal history of other diseases of the respiratory system 11/10/2016    History of allergic rhinitis    Personal history of other endocrine, nutritional and metabolic disease     History of hyperlipidemia    Personal history of other endocrine, nutritional and metabolic disease 02/28/2020    History of hyperkalemia    Personal history of other endocrine, nutritional and metabolic disease 07/27/2016    History of dehydration    Personal history of other endocrine, nutritional and metabolic disease 07/25/2016    History of dehydration    Personal history of other infectious and parasitic diseases     History of varicella    Personal history of other mental and behavioral disorders 03/19/2014    History of depression    Personal history of other specified conditions     History of edema    Personal history of other specified conditions 03/07/2014    History of edema    Personal history of other venous thrombosis and embolism     H/O blood clots    Personal history of pulmonary embolism     Personal history of pulmonary embolism    Personal history of urinary (tract) infections 12/08/2015    History of urinary tract infection    Trigger  finger, right ring finger 02/28/2020    Trigger ring finger of right hand     Past Surgical History:   Procedure Laterality Date    HERNIA REPAIR       Allergies   Allergen Reactions    Animal Dander Unknown     Current Outpatient Medications   Medication Sig Dispense Refill    ferrous sulfate, 325 mg ferrous sulfate, tablet Take 1 tablet by mouth once daily with breakfast.      latanoprost (Xalatan) 0.005 % ophthalmic solution INSTILL 1 DROP INTO LEFT EYE AT BEDTIME      simvastatin (Zocor) 40 mg tablet TAKE 1 TABLET (40 MG) BY MOUTH ONCE DAILY AT BEDTIME. 90 tablet 0    warfarin (Coumadin) 5 mg tablet TAKE 1 TABLET EVERY DAY AND A 1/2 ON WEDNESDAY AND SUNDAY 90 tablet 3     No current facility-administered medications for this visit.     Family History   Problem Relation Name Age of Onset    Diabetes Mother      Heart failure Mother      Heart failure Father       Social History     Socioeconomic History    Marital status:      Spouse name: None    Number of children: None    Years of education: None    Highest education level: None   Occupational History    None   Tobacco Use    Smoking status: Never    Smokeless tobacco: Never   Vaping Use    Vaping Use: Never used   Substance and Sexual Activity    Alcohol use: Never    Drug use: Never    Sexual activity: None   Other Topics Concern    None   Social History Narrative    None     Social Determinants of Health     Financial Resource Strain: Not on file   Food Insecurity: Not on file   Transportation Needs: Not on file   Physical Activity: Not on file   Stress: Not on file   Social Connections: Not on file   Intimate Partner Violence: Not on file   Housing Stability: Not on file     Immunization History   Administered Date(s) Administered    Pfizer Purple Cap SARS-CoV-2 04/16/2021, 05/07/2021    Pneumococcal conjugate vaccine, 13-valent (PREVNAR 13) 04/27/2016    Pneumococcal polysaccharide vaccine, 23-valent, age 2 years and older (PNEUMOVAX 23) 03/12/2012  "   Tdap vaccine, age 7 year and older (BOOSTRIX, ADACEL) 08/01/2011    Zoster vaccine, recombinant, adult (SHINGRIX) 07/26/2019, 02/28/2020    Zoster, live 06/01/2016       Review of Systems  Review of systems is otherwise negative unless stated above or in history of present illness.    Objective   Visit Vitals  /71 (BP Location: Left arm, Patient Position: Sitting, BP Cuff Size: Adult)   Pulse 80   Temp 36.4 °C (97.6 °F)   Ht 1.702 m (5' 7\")   Wt 65.6 kg (144 lb 9.6 oz)   SpO2 98%   BMI 22.65 kg/m²   Smoking Status Never   BSA 1.76 m²     Physical Exam  Constitutional: Not in pain     General: not in acute distress.   HENT:      Head: Normocephalic and atraumatic.      Nose: Nose normal.   Eyes:      Extraocular Movements: Extraocular movements intact.      Conjunctiva/sclera: Conjunctivae normal.   Cardiovascular: S1-S2 S4     Rate and Rhythm: Normal rate ,  No M/R/G  Pulmonary:      Effort: Pulmonary effort is normal.      Breath sounds: Normal, Bilat Equal AE  Skin:     General: Skin is warm.   Neurological:      Mental Status: He is alert and oriented to person, place, and time.   Psychiatric:         Mood and Affect: Mood normal.         Behavior: Behavior normal.   Musculoskeletal   FROM in all extremitirs,  Joint-no swelling or tenderness    No visits with results within 4 Month(s) from this visit.   Latest known visit with results is:   Orders Only on 11/27/2023   Component Date Value Ref Range Status    [facility] Protime Patient 11/27/2023 26.0 (H)  9.8 - 12.8 seconds Final    [facility] INR 11/27/2023 2.3   Final    [facility] HGB A1C 11/27/2023 5.1  % Final    [facility] Prostatic Specific Ag S* 11/27/2023 0.8  0.0 - 4.0 ng/mL Final    [facility] Magnesium 11/27/2023 2.1  1.6 - 2.6 mg/dL Final    [facility] Uric Acid 11/27/2023 7.6  3.7 - 9.2 mg/dL Final    [facility] GPT 11/27/2023 13  10 - 49 unit/L Final    [facility] Creatinine 11/27/2023 1.1  0.6 - 1.1 mg/dL Final    [facility] Alk Phos " 11/27/2023 92  45 - 117 unit/L Final    [facility] Total Protein 11/27/2023 6.4  5.7 - 8.2 g/dL Final    [facility] CO2, venous 11/27/2023 30.0  20.0 - 31.0 mmol/L Final    [facility] Glomerular Filtration R* 11/27/2023 >60  mL/min/1.73m? Final    [facility] Calculated Osmolality 11/27/2023 289  275 - 295 mOsm/kg Final    [facility] K 11/27/2023 5.1  3.5 - 5.1 mmol/L Final    [facility] Globulin 11/27/2023 2.9  g/dL Final    [facility] BUN 11/27/2023 17  9 - 23 mg/dL Final    [facility] Calcium 11/27/2023 9.0  8.7 - 10.4 mg/dL Final    [facility] GOT 11/27/2023 22  15 - 37 unit/L Final    [facility] Glucose 11/27/2023 100  74 - 106 mg/dL Final    [facility] ALB 11/27/2023 3.5  3.4 - 5.0 g/dL Final    [facility] GFR AA 11/27/2023 >60   Final    [facility] Bilirubin, Total 11/27/2023 0.30  0.30 - 1.20 mg/dL Final    [facility] Chloride 11/27/2023 110 (H)  98 - 107 mmol/L Final    [facility] A/G Ratio 11/27/2023 1.2   Final    [facility] Na 11/27/2023 144  135 - 145 mmol/L Final    [facility] BUN/Creat Ratio 11/27/2023 15.5   Final    [facility] TSH 11/27/2023 1.21  0.55 - 4.78 uIU/ml Final    [facility] HCT 11/27/2023 34.0 (L)  41.0 - 52.0 % Final    [facility] RBC 11/27/2023 4.03 (L)  4.70 - 6.10 x10 Final    [facility] Platelet 11/27/2023 394  150 - 450 x10 Final    [facility] Instr WBC 11/27/2023 6.9   Final    [facility] MCHC 11/27/2023 31.8 (L)  32.0 - 37.0 g/dL Final    [facility] DIFF? 11/27/2023 No   Final    [facility] MCV 11/27/2023 84.5  80.0 - 100.0 fL Final    [facility] MPV 11/27/2023 7.1 (L)  7.4 - 10.4 fL Final    [facility] HGB 11/27/2023 10.8 (L)  13.5 - 17.5 g/dL Final    [facility] WBC 11/27/2023 6.9  4.5 - 11.0 x10 Final    [facility] RDW 11/27/2023 16.7 (H)  11.5 - 14.5 % Final    [facility] DxH Actions 11/27/2023 See Notes (A)   Final    [facility] MCH 11/27/2023 26.8 (L)  27.0 - 34.0 pg Final    [facility] Nucleated RBC 11/27/2023 0  /100WBC Final    [facility] Lymph Count 11/27/2023 1.24  1.20  - 4.80 x1000 Final    [facility] Scan Differential 11/27/2023 Diff Scd   Final    [facility] Basos % 11/27/2023 0.4  % Final    [facility] Baso Count 11/27/2023 0.03  0.00 - 0.20 x1000 Final    [facility] Mono % 11/27/2023 7.3  % Final    [facility] Neutrophil % 11/27/2023 71.8  % Final    [facility] Mono Count 11/27/2023 0.50  0.10 - 1.00 x1000 Final    [facility] Neutrophil Count (ANC) 11/27/2023 4.96  1.40 - 8.80 x1000 Final    [facility] Red Blood Cell Morpholo* 11/27/2023 See Notes (A)   Final    [facility] Eosin % 11/27/2023 2.7  % Final    [facility] Lymph % 11/27/2023 17.9  % Final    [facility] Eos Count 11/27/2023 0.19  0.00 - 0.50 x1000 Final       Radiology: Reviewed imaging in powerchart.  No results found.      Charting was completed using voice recognition technology and may include unintended errors.

## 2024-04-05 DIAGNOSIS — E78.2 HYPERLIPEMIA, MIXED: ICD-10-CM

## 2024-04-05 DIAGNOSIS — Z00.00 ENCOUNTER FOR GENERAL ADULT MEDICAL EXAMINATION WITHOUT ABNORMAL FINDINGS: ICD-10-CM

## 2024-04-05 RX ORDER — SIMVASTATIN 40 MG/1
40 TABLET, FILM COATED ORAL NIGHTLY
Qty: 90 TABLET | Refills: 0 | Status: SHIPPED | OUTPATIENT
Start: 2024-04-05

## 2024-06-30 DIAGNOSIS — E78.2 HYPERLIPEMIA, MIXED: ICD-10-CM

## 2024-07-01 RX ORDER — SIMVASTATIN 40 MG/1
40 TABLET, FILM COATED ORAL NIGHTLY
Qty: 90 TABLET | Refills: 3 | Status: SHIPPED | OUTPATIENT
Start: 2024-07-01

## 2024-08-07 DIAGNOSIS — Z00.00 ENCOUNTER FOR GENERAL ADULT MEDICAL EXAMINATION WITHOUT ABNORMAL FINDINGS: ICD-10-CM

## 2024-08-08 RX ORDER — WARFARIN SODIUM 5 MG/1
TABLET ORAL
Qty: 90 TABLET | Refills: 3 | Status: SHIPPED | OUTPATIENT
Start: 2024-08-08

## 2024-09-27 ENCOUNTER — APPOINTMENT (OUTPATIENT)
Dept: PRIMARY CARE | Facility: CLINIC | Age: 84
End: 2024-09-27
Payer: MEDICARE

## 2024-09-27 VITALS
OXYGEN SATURATION: 96 % | TEMPERATURE: 97.4 F | SYSTOLIC BLOOD PRESSURE: 155 MMHG | HEART RATE: 66 BPM | DIASTOLIC BLOOD PRESSURE: 62 MMHG | HEIGHT: 67 IN | WEIGHT: 143 LBS | BODY MASS INDEX: 22.44 KG/M2

## 2024-09-27 DIAGNOSIS — R73.9 HYPERGLYCEMIA: ICD-10-CM

## 2024-09-27 DIAGNOSIS — E78.2 HYPERLIPEMIA, MIXED: ICD-10-CM

## 2024-09-27 DIAGNOSIS — N40.0 BENIGN PROSTATIC HYPERPLASIA WITHOUT LOWER URINARY TRACT SYMPTOMS: ICD-10-CM

## 2024-09-27 DIAGNOSIS — I10 BENIGN ESSENTIAL HYPERTENSION: ICD-10-CM

## 2024-09-27 DIAGNOSIS — Z86.69 HISTORY OF GLAUCOMA: ICD-10-CM

## 2024-09-27 DIAGNOSIS — Z00.00 MEDICARE ANNUAL WELLNESS VISIT, SUBSEQUENT: Primary | ICD-10-CM

## 2024-09-27 DIAGNOSIS — Z13.820 ENCOUNTER FOR OSTEOPOROSIS SCREENING IN ASYMPTOMATIC POSTMENOPAUSAL PATIENT: ICD-10-CM

## 2024-09-27 DIAGNOSIS — H61.20 IMPACTED CERUMEN, UNSPECIFIED LATERALITY: ICD-10-CM

## 2024-09-27 DIAGNOSIS — Z78.0 ENCOUNTER FOR OSTEOPOROSIS SCREENING IN ASYMPTOMATIC POSTMENOPAUSAL PATIENT: ICD-10-CM

## 2024-09-27 DIAGNOSIS — I82.401: ICD-10-CM

## 2024-09-27 DIAGNOSIS — D50.0 IRON DEFICIENCY ANEMIA DUE TO CHRONIC BLOOD LOSS: ICD-10-CM

## 2024-09-27 DIAGNOSIS — Z13.820 SCREENING FOR OSTEOPOROSIS: ICD-10-CM

## 2024-09-27 DIAGNOSIS — I51.7 LVH (LEFT VENTRICULAR HYPERTROPHY): ICD-10-CM

## 2024-09-27 PROCEDURE — 1159F MED LIST DOCD IN RCRD: CPT | Performed by: INTERNAL MEDICINE

## 2024-09-27 PROCEDURE — 1123F ACP DISCUSS/DSCN MKR DOCD: CPT | Performed by: INTERNAL MEDICINE

## 2024-09-27 PROCEDURE — 1157F ADVNC CARE PLAN IN RCRD: CPT | Performed by: INTERNAL MEDICINE

## 2024-09-27 PROCEDURE — 1160F RVW MEDS BY RX/DR IN RCRD: CPT | Performed by: INTERNAL MEDICINE

## 2024-09-27 PROCEDURE — 3077F SYST BP >= 140 MM HG: CPT | Performed by: INTERNAL MEDICINE

## 2024-09-27 PROCEDURE — G0439 PPPS, SUBSEQ VISIT: HCPCS | Performed by: INTERNAL MEDICINE

## 2024-09-27 PROCEDURE — 99213 OFFICE O/P EST LOW 20 MIN: CPT | Performed by: INTERNAL MEDICINE

## 2024-09-27 PROCEDURE — 1036F TOBACCO NON-USER: CPT | Performed by: INTERNAL MEDICINE

## 2024-09-27 PROCEDURE — 1170F FXNL STATUS ASSESSED: CPT | Performed by: INTERNAL MEDICINE

## 2024-09-27 PROCEDURE — 3078F DIAST BP <80 MM HG: CPT | Performed by: INTERNAL MEDICINE

## 2024-09-27 RX ORDER — RAMIPRIL 5 MG/1
5 CAPSULE ORAL DAILY
Qty: 30 CAPSULE | Refills: 11 | Status: SHIPPED | OUTPATIENT
Start: 2024-09-27 | End: 2025-09-27

## 2024-09-27 RX ORDER — NEOMYCIN SULFATE, POLYMYXIN B SULFATE, HYDROCORTISONE 3.5; 10000; 1 MG/ML; [USP'U]/ML; MG/ML
3 SOLUTION/ DROPS AURICULAR (OTIC) 3 TIMES DAILY
Qty: 1.35 ML | Refills: 0 | Status: SHIPPED | OUTPATIENT
Start: 2024-09-27 | End: 2024-09-30

## 2024-09-27 ASSESSMENT — PATIENT HEALTH QUESTIONNAIRE - PHQ9
2. FEELING DOWN, DEPRESSED OR HOPELESS: NOT AT ALL
SUM OF ALL RESPONSES TO PHQ9 QUESTIONS 1 AND 2: 0
SUM OF ALL RESPONSES TO PHQ9 QUESTIONS 1 AND 2: 0
1. LITTLE INTEREST OR PLEASURE IN DOING THINGS: NOT AT ALL
2. FEELING DOWN, DEPRESSED OR HOPELESS: NOT AT ALL
1. LITTLE INTEREST OR PLEASURE IN DOING THINGS: NOT AT ALL

## 2024-09-27 ASSESSMENT — ACTIVITIES OF DAILY LIVING (ADL)
DRESSING: INDEPENDENT
DOING_HOUSEWORK: INDEPENDENT
BATHING: INDEPENDENT
GROCERY_SHOPPING: INDEPENDENT
MANAGING_FINANCES: INDEPENDENT
TAKING_MEDICATION: INDEPENDENT

## 2024-09-27 NOTE — PROGRESS NOTES
Assessment and Plan:  Problem List Items Addressed This Visit    None        Chief Complaint:   Annual Medicare Wellness Office Exam/Comprehensive Problem Focused Follow Up and Physical Exam      HPI:        Patient Active Problem List:  Patient Active Problem List   Diagnosis    Acute embolism and thrombosis of unspecified deep veins of unspecified lower extremity (Multi)    Hyperlipemia, mixed    LVH (left ventricular hypertrophy)    Iron deficiency anemia    History of glaucoma          Comprehensive Medical/Surgical/Social/Family History:  Family History   Problem Relation Name Age of Onset    Diabetes Mother      Heart failure Mother      Heart failure Father         Past Medical History:   Diagnosis Date    Acute upper respiratory infection, unspecified 10/24/2019    Acute upper respiratory infection    Anxiety disorder, unspecified     Anxiety    Disorder of prostate, unspecified     Prostate disease    Dorsalgia, unspecified 11/28/2017    Dorsodynia    Encounter for screening for malignant neoplasm of colon 10/24/2019    Colon cancer screening    Encounter for screening for malignant neoplasm of rectum 10/24/2019    Screening for rectal cancer    Hyperosmolality and hypernatremia 06/14/2018    Hypernatremia    Impacted cerumen, bilateral 07/26/2017    Excessive ear wax, bilateral    Other conditions influencing health status 12/28/2016    History of cough    Other disorders of phosphorus metabolism 10/24/2016    Low phosphate levels    Pain in right finger(s) 06/14/2018    Pain of right thumb    Personal history of diseases of the skin and subcutaneous tissue 02/12/2015    History of folliculitis    Personal history of diseases of the skin and subcutaneous tissue 02/28/2020    History of eczema    Personal history of other diseases of the circulatory system     History of hypertension    Personal history of other diseases of the digestive system 08/02/2021    History of gastroesophageal reflux (GERD)     Personal history of other diseases of the musculoskeletal system and connective tissue     Personal history of arthritis    Personal history of other diseases of the nervous system and sense organs 07/26/2017    History of ear pain    Personal history of other diseases of the nervous system and sense organs 07/26/2017    History of impacted cerumen    Personal history of other diseases of the respiratory system 11/10/2016    History of allergic rhinitis    Personal history of other endocrine, nutritional and metabolic disease     History of hyperlipidemia    Personal history of other endocrine, nutritional and metabolic disease 02/28/2020    History of hyperkalemia    Personal history of other endocrine, nutritional and metabolic disease 07/27/2016    History of dehydration    Personal history of other endocrine, nutritional and metabolic disease 07/25/2016    History of dehydration    Personal history of other infectious and parasitic diseases     History of varicella    Personal history of other mental and behavioral disorders 03/19/2014    History of depression    Personal history of other specified conditions     History of edema    Personal history of other specified conditions 03/07/2014    History of edema    Personal history of other venous thrombosis and embolism     H/O blood clots    Personal history of pulmonary embolism     Personal history of pulmonary embolism    Personal history of urinary (tract) infections 12/08/2015    History of urinary tract infection    Trigger finger, right ring finger 02/28/2020    Trigger ring finger of right hand       Past Surgical History:   Procedure Laterality Date    HERNIA REPAIR         Social History     Socioeconomic History    Marital status:    Tobacco Use    Smoking status: Never    Smokeless tobacco: Never   Vaping Use    Vaping status: Never Used   Substance and Sexual Activity    Alcohol use: Never    Drug use: Never       Tobacco/Alcohol/Opioid use,  as well as Illicit Drug Use was screened for/reviewed and documented in Social Documentation section of the chart and medication list as appropriate    Allergies and Medications  Allergies   Allergen Reactions    Animal Dander Unknown     Current Outpatient Medications   Medication Sig Dispense Refill    latanoprost (Xalatan) 0.005 % ophthalmic solution INSTILL 1 DROP INTO LEFT EYE AT BEDTIME      simvastatin (Zocor) 40 mg tablet TAKE 1 TABLET (40 MG) BY MOUTH ONCE DAILY AT BEDTIME. 90 tablet 3    warfarin (Coumadin) 5 mg tablet TAKE 1 TABLET EVERY DAY AND A 1/2 ON WEDNESDAY AND SUNDAY 90 tablet 3     No current facility-administered medications for this visit.       Medications and Supplements  prescribed by me and other practitioners or clinical pharmacist (such as prescriptions, OTC's, herbal therapies and supplements) were reviewed and documented in the medical record.     Advance directives  Advanced Care Planning (including a Living Will, Healthcare POA, as well as specific end of life choices and/or directives), was discussed for approximately 16 minutes with the patient and/or surrogate, voluntarily, and documented in the Problem List of the medical record.     Cardiac Risk Assessment  Cardiovascular risk was discussed and, if needed, lifestyle modifications recommended, including nutritional choices, exercise, and elimination of habits contributing to risk. We agreed on a plan to reduce the current cardiovascular risk based on above discussion as needed.  Aspirin use/disuse was discussed and documented in the Problem List of the medical record after reviewing the updated guidelines below:    Consider low dose Aspirin ( mg) use if the benefit for cardiovascular disease prevention outweighs risk for bleeding complications.   In general, low dose ASA should be considered:  In patients WITHOUT prior MI/stroke/PAD (primary prevention):   a. Age <60: Use if 10-year cardiovascular disease risk >20%, with  "discussion of risks and benefits with patient  b. Age 60-<70: Use if 10-year cardiovascular disease risk >20% and low bleeding (e.g., gastrointenstinal) risk, with discussion of risks and benefits with patient  c. Age >=70: Do not use    In patients WITH prior MI/stroke/PAD (secondary prevention):   Generally use unless extremely high bleeding (e.g., gastrointenstinal) risk, with discussion of risks and benefits with patient    ROS otherwise negative aside from what was mentioned above in HPI.    Visit Vitals  /62   Pulse 66   Temp 36.3 °C (97.4 °F)   Ht 1.702 m (5' 7\")   Wt 64.9 kg (143 lb)   SpO2 96%   BMI 22.40 kg/m²   Smoking Status Never   BSA 1.75 m²       Physical Exam  Physical Exam:    Appearance: Alert, oriented , cooperative,  in no acute distress. Well nourished & well hydrated.    Skin: Intact,  dry skin, no lesions, rash, petechiae or purpura.     Eyes: Glaucoma    ENT: Hearing grossly intact. External auditory canals patent, tympanic membranes intact with visible landmarks. Nares patent, mucus membranes moist. Dentition without lesions. Pharynx clear, uvula midline.     Neck: Supple, without meningismus. Thyroid not palpable. Trachea at midline. No lymphadenopathy.    Pulmonary: Crackles    Cardiac: High blood pressure LVH heart murmur.    Abdomen: Soft, nontender, active bowel sounds.  No palpable organomegaly.  No rebound or guarding.  No CVA tenderness.    Genitourinary: Exam deferred.    Musculoskeletal:  arthritis of hip and spine    Neurological: Neurology  Psychiatric: Appropriate mood and affect.         During the course of the visit the patient was educated and counseled about age appropriate screening and preventive services. Completed preventive screenings were documented in the chart and orders were placed for outstanding screenings/procedures as documented in the Assessment and Plan.    Patient Instructions (the written plan) was given to the patient at check out.    Charting was " completed using voice recognition technology and may include unintended errors.

## 2024-09-30 ENCOUNTER — APPOINTMENT (OUTPATIENT)
Dept: PRIMARY CARE | Facility: CLINIC | Age: 84
End: 2024-09-30
Payer: MEDICARE

## 2024-09-30 ENCOUNTER — LAB (OUTPATIENT)
Dept: LAB | Facility: LAB | Age: 84
End: 2024-09-30
Payer: MEDICARE

## 2024-09-30 VITALS
HEART RATE: 65 BPM | TEMPERATURE: 97.7 F | WEIGHT: 143 LBS | HEIGHT: 67 IN | OXYGEN SATURATION: 97 % | DIASTOLIC BLOOD PRESSURE: 62 MMHG | SYSTOLIC BLOOD PRESSURE: 154 MMHG | BODY MASS INDEX: 22.44 KG/M2

## 2024-09-30 DIAGNOSIS — H61.23 BILATERAL IMPACTED CERUMEN: Primary | ICD-10-CM

## 2024-09-30 DIAGNOSIS — Z13.820 SCREENING FOR OSTEOPOROSIS: ICD-10-CM

## 2024-09-30 DIAGNOSIS — Z00.00 MEDICARE ANNUAL WELLNESS VISIT, SUBSEQUENT: ICD-10-CM

## 2024-09-30 DIAGNOSIS — I10 BENIGN ESSENTIAL HYPERTENSION: ICD-10-CM

## 2024-09-30 DIAGNOSIS — Z86.69 HISTORY OF GLAUCOMA: ICD-10-CM

## 2024-09-30 DIAGNOSIS — R73.9 HYPERGLYCEMIA: ICD-10-CM

## 2024-09-30 DIAGNOSIS — I82.401: ICD-10-CM

## 2024-09-30 DIAGNOSIS — I51.7 LVH (LEFT VENTRICULAR HYPERTROPHY): ICD-10-CM

## 2024-09-30 DIAGNOSIS — D50.0 IRON DEFICIENCY ANEMIA DUE TO CHRONIC BLOOD LOSS: ICD-10-CM

## 2024-09-30 DIAGNOSIS — E78.2 HYPERLIPEMIA, MIXED: ICD-10-CM

## 2024-09-30 DIAGNOSIS — N40.0 BENIGN PROSTATIC HYPERPLASIA WITHOUT LOWER URINARY TRACT SYMPTOMS: ICD-10-CM

## 2024-09-30 PROBLEM — Z78.0 ENCOUNTER FOR OSTEOPOROSIS SCREENING IN ASYMPTOMATIC POSTMENOPAUSAL PATIENT: Status: RESOLVED | Noted: 2023-09-15 | Resolved: 2024-09-30

## 2024-09-30 PROBLEM — D50.9 IRON DEFICIENCY ANEMIA: Status: RESOLVED | Noted: 2024-03-28 | Resolved: 2024-09-30

## 2024-09-30 LAB
ALBUMIN SERPL BCP-MCNC: 4.2 G/DL (ref 3.4–5)
ALP SERPL-CCNC: 73 U/L (ref 33–136)
ALT SERPL W P-5'-P-CCNC: 18 U/L (ref 10–52)
ANION GAP SERPL CALC-SCNC: 13 MMOL/L (ref 10–20)
AST SERPL W P-5'-P-CCNC: 30 U/L (ref 9–39)
BASOPHILS # BLD AUTO: 0.03 X10*3/UL (ref 0–0.1)
BASOPHILS NFR BLD AUTO: 0.5 %
BILIRUB SERPL-MCNC: 0.7 MG/DL (ref 0–1.2)
BUN SERPL-MCNC: 14 MG/DL (ref 6–23)
CALCIUM SERPL-MCNC: 9.3 MG/DL (ref 8.6–10.6)
CHLORIDE SERPL-SCNC: 108 MMOL/L (ref 98–107)
CHOLEST SERPL-MCNC: 143 MG/DL (ref 0–199)
CHOLESTEROL/HDL RATIO: 2.6
CO2 SERPL-SCNC: 28 MMOL/L (ref 21–32)
CREAT SERPL-MCNC: 1.05 MG/DL (ref 0.5–1.3)
EGFRCR SERPLBLD CKD-EPI 2021: 70 ML/MIN/1.73M*2
EOSINOPHIL # BLD AUTO: 0.13 X10*3/UL (ref 0–0.4)
EOSINOPHIL NFR BLD AUTO: 2 %
ERYTHROCYTE [DISTWIDTH] IN BLOOD BY AUTOMATED COUNT: 14.9 % (ref 11.5–14.5)
EST. AVERAGE GLUCOSE BLD GHB EST-MCNC: 114 MG/DL
GLUCOSE SERPL-MCNC: 95 MG/DL (ref 74–99)
HBA1C MFR BLD: 5.6 %
HCT VFR BLD AUTO: 42.3 % (ref 41–52)
HDLC SERPL-MCNC: 55.3 MG/DL
HGB BLD-MCNC: 13.6 G/DL (ref 13.5–17.5)
IMM GRANULOCYTES # BLD AUTO: 0.02 X10*3/UL (ref 0–0.5)
IMM GRANULOCYTES NFR BLD AUTO: 0.3 % (ref 0–0.9)
INR PPP: 3.3 (ref 0.9–1.1)
LDLC SERPL CALC-MCNC: 59 MG/DL
LYMPHOCYTES # BLD AUTO: 1.26 X10*3/UL (ref 0.8–3)
LYMPHOCYTES NFR BLD AUTO: 19.6 %
MAGNESIUM SERPL-MCNC: 2.07 MG/DL (ref 1.6–2.4)
MCH RBC QN AUTO: 29.4 PG (ref 26–34)
MCHC RBC AUTO-ENTMCNC: 32.2 G/DL (ref 32–36)
MCV RBC AUTO: 92 FL (ref 80–100)
MONOCYTES # BLD AUTO: 0.77 X10*3/UL (ref 0.05–0.8)
MONOCYTES NFR BLD AUTO: 12 %
NEUTROPHILS # BLD AUTO: 4.23 X10*3/UL (ref 1.6–5.5)
NEUTROPHILS NFR BLD AUTO: 65.6 %
NON HDL CHOLESTEROL: 88 MG/DL (ref 0–149)
NRBC BLD-RTO: 0 /100 WBCS (ref 0–0)
PLATELET # BLD AUTO: 300 X10*3/UL (ref 150–450)
POTASSIUM SERPL-SCNC: 5 MMOL/L (ref 3.5–5.3)
PROT SERPL-MCNC: 6.2 G/DL (ref 6.4–8.2)
PROTHROMBIN TIME: 37.3 SECONDS (ref 9.8–12.8)
PSA SERPL-MCNC: 0.67 NG/ML
RBC # BLD AUTO: 4.62 X10*6/UL (ref 4.5–5.9)
SODIUM SERPL-SCNC: 144 MMOL/L (ref 136–145)
TRIGL SERPL-MCNC: 145 MG/DL (ref 0–149)
TSH SERPL-ACNC: 0.92 MIU/L (ref 0.44–3.98)
URATE SERPL-MCNC: 5.9 MG/DL (ref 4–7.5)
VLDL: 29 MG/DL (ref 0–40)
WBC # BLD AUTO: 6.4 X10*3/UL (ref 4.4–11.3)

## 2024-09-30 PROCEDURE — 84550 ASSAY OF BLOOD/URIC ACID: CPT

## 2024-09-30 PROCEDURE — 80061 LIPID PANEL: CPT

## 2024-09-30 PROCEDURE — 1160F RVW MEDS BY RX/DR IN RCRD: CPT | Performed by: INTERNAL MEDICINE

## 2024-09-30 PROCEDURE — 1123F ACP DISCUSS/DSCN MKR DOCD: CPT | Performed by: INTERNAL MEDICINE

## 2024-09-30 PROCEDURE — 84153 ASSAY OF PSA TOTAL: CPT

## 2024-09-30 PROCEDURE — 36415 COLL VENOUS BLD VENIPUNCTURE: CPT

## 2024-09-30 PROCEDURE — 84443 ASSAY THYROID STIM HORMONE: CPT

## 2024-09-30 PROCEDURE — 3077F SYST BP >= 140 MM HG: CPT | Performed by: INTERNAL MEDICINE

## 2024-09-30 PROCEDURE — 1157F ADVNC CARE PLAN IN RCRD: CPT | Performed by: INTERNAL MEDICINE

## 2024-09-30 PROCEDURE — 85610 PROTHROMBIN TIME: CPT

## 2024-09-30 PROCEDURE — 99213 OFFICE O/P EST LOW 20 MIN: CPT | Performed by: INTERNAL MEDICINE

## 2024-09-30 PROCEDURE — 80053 COMPREHEN METABOLIC PANEL: CPT

## 2024-09-30 PROCEDURE — 69209 REMOVE IMPACTED EAR WAX UNI: CPT | Performed by: INTERNAL MEDICINE

## 2024-09-30 PROCEDURE — 1036F TOBACCO NON-USER: CPT | Performed by: INTERNAL MEDICINE

## 2024-09-30 PROCEDURE — 3078F DIAST BP <80 MM HG: CPT | Performed by: INTERNAL MEDICINE

## 2024-09-30 PROCEDURE — 1159F MED LIST DOCD IN RCRD: CPT | Performed by: INTERNAL MEDICINE

## 2024-09-30 PROCEDURE — 83735 ASSAY OF MAGNESIUM: CPT

## 2024-09-30 PROCEDURE — 83036 HEMOGLOBIN GLYCOSYLATED A1C: CPT

## 2024-09-30 PROCEDURE — 85025 COMPLETE CBC W/AUTO DIFF WBC: CPT

## 2024-09-30 NOTE — PROGRESS NOTES
Earwax Subjective   Patient ID: Jordan Diaz is a 83 y.o. male who presents for Ear Fullness (Ear irrigation- bilateral ).    Assessment/Plan     Problem List Items Addressed This Visit       Benign essential hypertension    Acute embolism and thrombosis of unspecified deep veins of unspecified lower extremity (Multi)     Continue Coumadin monitor oxygen and bleeding         Hyperlipemia, mixed     Monitor CMP CPK twice a year         Impacted cerumen - Primary     With aseptic antiseptic precaution use hydrogen peroxide warm water irrigation security was used wax was removed no complication hearing improved          Patient was evaluated today, problem list was reviewed, problems and concerns addressed, Rx list reviewed and updated, lab and tests were noted and reviewed. Life style changes were discussed, always it works better if we eat plant based diet and plenty of fibres and roughage. Consume adequate amount of water and avoid alcohol, light to moderate physical activities and stress reduction are always beneficial for ongoing physical well being. Do not forget to have 6 to 7 hours of sleep regularly and avoid late night tomeka screen exposure.    HPI 83-year-old patient have hypertension hyperlipidemia DVT BPH iron deficiency anemia complaint of the deafness diagnosis of otitis externa wax given Cortisporin 12 hydrogen peroxide aseptic antiseptic precaution wax was removed no complication hearing improved    Had whitecoat syndrome with a blood pressure LVH advised care of ramipril 5 mg a day monitor BMP    Hyperlipidemia continue simvastatin    DVT continue Coumadin advise follow-up with the Coumadin clinic  Past Medical History:   Diagnosis Date    Acute upper respiratory infection, unspecified 10/24/2019    Acute upper respiratory infection    Anxiety disorder, unspecified     Anxiety    Disorder of prostate, unspecified     Prostate disease    Dorsalgia, unspecified 11/28/2017    Dorsodynia    Encounter  for screening for malignant neoplasm of colon 10/24/2019    Colon cancer screening    Encounter for screening for malignant neoplasm of rectum 10/24/2019    Screening for rectal cancer    Hyperosmolality and hypernatremia 06/14/2018    Hypernatremia    Impacted cerumen, bilateral 07/26/2017    Excessive ear wax, bilateral    Other conditions influencing health status 12/28/2016    History of cough    Other disorders of phosphorus metabolism 10/24/2016    Low phosphate levels    Pain in right finger(s) 06/14/2018    Pain of right thumb    Personal history of diseases of the skin and subcutaneous tissue 02/12/2015    History of folliculitis    Personal history of diseases of the skin and subcutaneous tissue 02/28/2020    History of eczema    Personal history of other diseases of the circulatory system     History of hypertension    Personal history of other diseases of the digestive system 08/02/2021    History of gastroesophageal reflux (GERD)    Personal history of other diseases of the musculoskeletal system and connective tissue     Personal history of arthritis    Personal history of other diseases of the nervous system and sense organs 07/26/2017    History of ear pain    Personal history of other diseases of the nervous system and sense organs 07/26/2017    History of impacted cerumen    Personal history of other diseases of the respiratory system 11/10/2016    History of allergic rhinitis    Personal history of other endocrine, nutritional and metabolic disease     History of hyperlipidemia    Personal history of other endocrine, nutritional and metabolic disease 02/28/2020    History of hyperkalemia    Personal history of other endocrine, nutritional and metabolic disease 07/27/2016    History of dehydration    Personal history of other endocrine, nutritional and metabolic disease 07/25/2016    History of dehydration    Personal history of other infectious and parasitic diseases     History of varicella     Personal history of other mental and behavioral disorders 03/19/2014    History of depression    Personal history of other specified conditions     History of edema    Personal history of other specified conditions 03/07/2014    History of edema    Personal history of other venous thrombosis and embolism     H/O blood clots    Personal history of pulmonary embolism     Personal history of pulmonary embolism    Personal history of urinary (tract) infections 12/08/2015    History of urinary tract infection    Trigger finger, right ring finger 02/28/2020    Trigger ring finger of right hand     Past Surgical History:   Procedure Laterality Date    HERNIA REPAIR       Allergies   Allergen Reactions    Animal Dander Unknown     Current Outpatient Medications   Medication Sig Dispense Refill    latanoprost (Xalatan) 0.005 % ophthalmic solution INSTILL 1 DROP INTO LEFT EYE AT BEDTIME      neomycin-polymyxin-HC (Cortisporin) otic solution Administer 3 drops into each ear 3 times a day for 3 days. 1.35 mL 0    ramipril (Altace) 5 mg capsule Take 1 capsule (5 mg) by mouth once daily. 30 capsule 11    simvastatin (Zocor) 40 mg tablet TAKE 1 TABLET (40 MG) BY MOUTH ONCE DAILY AT BEDTIME. 90 tablet 3    warfarin (Coumadin) 5 mg tablet TAKE 1 TABLET EVERY DAY AND A 1/2 ON WEDNESDAY AND SUNDAY 90 tablet 3     No current facility-administered medications for this visit.     Family History   Problem Relation Name Age of Onset    Diabetes Mother      Heart failure Mother      Heart failure Father       Social History     Socioeconomic History    Marital status:    Tobacco Use    Smoking status: Never    Smokeless tobacco: Never   Vaping Use    Vaping status: Never Used   Substance and Sexual Activity    Alcohol use: Never    Drug use: Never     Immunization History   Administered Date(s) Administered    Pfizer Purple Cap SARS-CoV-2 04/16/2021, 05/07/2021    Pneumococcal conjugate vaccine, 13-valent (PREVNAR 13) 04/27/2016     "Pneumococcal polysaccharide vaccine, 23-valent, age 2 years and older (PNEUMOVAX 23) 03/12/2012    Tdap vaccine, age 7 year and older (BOOSTRIX, ADACEL) 08/01/2011    Zoster vaccine, recombinant, adult (SHINGRIX) 07/26/2019, 02/28/2020    Zoster, live 06/01/2016       Review of Systems  Review of systems is otherwise negative unless stated above or in history of present illness.    Objective   Visit Vitals  /62   Pulse 65   Temp 36.5 °C (97.7 °F)   Ht 1.702 m (5' 7\")   Wt 64.9 kg (143 lb)   SpO2 97%   BMI 22.40 kg/m²   Smoking Status Never   BSA 1.75 m²     Physical Exam  Constitutional:       General: not in acute distress.   HENT: Bilateral brivanib vacs     Head: Normocephalic and atraumatic.      Nose: Nose normal.   Eyes:      Extraocular Movements: Extraocular movements intact.      Conjunctiva/sclera: Conjunctivae normal.   Cardiovascular: S4     Rate and Rhythm: Normal rate ,  No M/R/G  Pulmonary:      Effort: Pulmonary effort is normal.      Breath sounds: Normal, Bilat Equal AE  Skin:     General: Skin is warm.   Neurological:      Mental Status: He is alert and oriented to person, place, and time.   Psychiatric:   Neuralgia   Mood and Affect: Mood normal.         Behavior: Behavior normal.   Musculoskeletal   FROM in all extremitirs,  Joint-no swelling or tenderness    No visits with results within 4 Month(s) from this visit.   Latest known visit with results is:   Orders Only on 11/27/2023   Component Date Value Ref Range Status    [facility] Protime Patient 11/27/2023 26.0 (H)  9.8 - 12.8 seconds Final    [facility] INR 11/27/2023 2.3   Final    [facility] HGB A1C 11/27/2023 5.1  % Final    [facility] Prostatic Specific Ag S* 11/27/2023 0.8  0.0 - 4.0 ng/mL Final    [facility] Magnesium 11/27/2023 2.1  1.6 - 2.6 mg/dL Final    [facility] Uric Acid 11/27/2023 7.6  3.7 - 9.2 mg/dL Final    [facility] GPT 11/27/2023 13  10 - 49 unit/L Final    [facility] Creatinine 11/27/2023 1.1  0.6 - 1.1 mg/dL " Final    [facility] Alk Phos 11/27/2023 92  45 - 117 unit/L Final    [facility] Total Protein 11/27/2023 6.4  5.7 - 8.2 g/dL Final    [facility] CO2, venous 11/27/2023 30.0  20.0 - 31.0 mmol/L Final    [facility] Glomerular Filtration R* 11/27/2023 >60  mL/min/1.73m? Final    [facility] Calculated Osmolality 11/27/2023 289  275 - 295 mOsm/kg Final    [facility] K 11/27/2023 5.1  3.5 - 5.1 mmol/L Final    [facility] Globulin 11/27/2023 2.9  g/dL Final    [facility] BUN 11/27/2023 17  9 - 23 mg/dL Final    [facility] Calcium 11/27/2023 9.0  8.7 - 10.4 mg/dL Final    [facility] GOT 11/27/2023 22  15 - 37 unit/L Final    [facility] Glucose 11/27/2023 100  74 - 106 mg/dL Final    [facility] ALB 11/27/2023 3.5  3.4 - 5.0 g/dL Final    [facility] GFR AA 11/27/2023 >60   Final    [facility] Bilirubin, Total 11/27/2023 0.30  0.30 - 1.20 mg/dL Final    [facility] Chloride 11/27/2023 110 (H)  98 - 107 mmol/L Final    [facility] A/G Ratio 11/27/2023 1.2   Final    [facility] Na 11/27/2023 144  135 - 145 mmol/L Final    [facility] BUN/Creat Ratio 11/27/2023 15.5   Final    [facility] TSH 11/27/2023 1.21  0.55 - 4.78 uIU/ml Final    [facility] HCT 11/27/2023 34.0 (L)  41.0 - 52.0 % Final    [facility] RBC 11/27/2023 4.03 (L)  4.70 - 6.10 x10 Final    [facility] Platelet 11/27/2023 394  150 - 450 x10 Final    [facility] Instr WBC 11/27/2023 6.9   Final    [facility] MCHC 11/27/2023 31.8 (L)  32.0 - 37.0 g/dL Final    [facility] DIFF? 11/27/2023 No   Final    [facility] MCV 11/27/2023 84.5  80.0 - 100.0 fL Final    [facility] MPV 11/27/2023 7.1 (L)  7.4 - 10.4 fL Final    [facility] HGB 11/27/2023 10.8 (L)  13.5 - 17.5 g/dL Final    [facility] WBC 11/27/2023 6.9  4.5 - 11.0 x10 Final    [facility] RDW 11/27/2023 16.7 (H)  11.5 - 14.5 % Final    [facility] DxH Actions 11/27/2023 See Notes (A)   Final    [facility] MCH 11/27/2023 26.8 (L)  27.0 - 34.0 pg Final    [facility] Nucleated RBC 11/27/2023 0  /100WBC Final    [facility]  Lymph Count 11/27/2023 1.24  1.20 - 4.80 x1000 Final    [facility] Scan Differential 11/27/2023 Diff Scd   Final    [facility] Basos % 11/27/2023 0.4  % Final    [facility] Baso Count 11/27/2023 0.03  0.00 - 0.20 x1000 Final    [facility] Mono % 11/27/2023 7.3  % Final    [facility] Neutrophil % 11/27/2023 71.8  % Final    [facility] Mono Count 11/27/2023 0.50  0.10 - 1.00 x1000 Final    [facility] Neutrophil Count (ANC) 11/27/2023 4.96  1.40 - 8.80 x1000 Final    [facility] Red Blood Cell Morpholo* 11/27/2023 See Notes (A)   Final    [facility] Eosin % 11/27/2023 2.7  % Final    [facility] Lymph % 11/27/2023 17.9  % Final    [facility] Eos Count 11/27/2023 0.19  0.00 - 0.50 x1000 Final       Radiology: Reviewed imaging in powerchart.  No results found.      Charting was completed using voice recognition technology and may include unintended errors.

## 2024-09-30 NOTE — ASSESSMENT & PLAN NOTE
With aseptic antiseptic precaution use hydrogen peroxide warm water irrigation security was used wax was removed no complication hearing improved

## 2024-10-01 ENCOUNTER — TELEPHONE (OUTPATIENT)
Dept: PRIMARY CARE | Facility: CLINIC | Age: 84
End: 2024-10-01
Payer: MEDICARE

## 2024-10-01 NOTE — TELEPHONE ENCOUNTER
----- Message from Annamarie Gaspar sent at 10/1/2024 10:23 AM EDT -----  INR 3.3 protein 6.2 advised follow-up with the Coumadin clinic for your INR

## 2025-02-11 ENCOUNTER — TELEPHONE (OUTPATIENT)
Dept: GASTROENTEROLOGY | Facility: CLINIC | Age: 85
End: 2025-02-11
Payer: MEDICARE

## 2025-02-11 NOTE — TELEPHONE ENCOUNTER
Left message for patient to return call to office to schedule consult for colonoscopy; patient is over the age of 75 and will need to be seen in office first.

## 2025-03-31 ENCOUNTER — APPOINTMENT (OUTPATIENT)
Dept: PRIMARY CARE | Facility: CLINIC | Age: 85
End: 2025-03-31
Payer: MEDICARE

## 2025-04-22 ENCOUNTER — APPOINTMENT (OUTPATIENT)
Dept: PRIMARY CARE | Facility: CLINIC | Age: 85
End: 2025-04-22
Payer: MEDICARE

## 2025-04-22 VITALS
HEART RATE: 83 BPM | OXYGEN SATURATION: 96 % | SYSTOLIC BLOOD PRESSURE: 168 MMHG | HEIGHT: 67 IN | DIASTOLIC BLOOD PRESSURE: 84 MMHG | TEMPERATURE: 97.3 F | WEIGHT: 146.8 LBS | BODY MASS INDEX: 23.04 KG/M2

## 2025-04-22 DIAGNOSIS — M81.6 LOCALIZED OSTEOPOROSIS WITHOUT CURRENT PATHOLOGICAL FRACTURE: ICD-10-CM

## 2025-04-22 DIAGNOSIS — I51.7 LVH (LEFT VENTRICULAR HYPERTROPHY): ICD-10-CM

## 2025-04-22 DIAGNOSIS — I82.401 ACUTE EMBOLISM AND THROMBOSIS OF DEEP VEIN OF RIGHT LOWER EXTREMITY: ICD-10-CM

## 2025-04-22 DIAGNOSIS — Z86.69 HISTORY OF GLAUCOMA: ICD-10-CM

## 2025-04-22 DIAGNOSIS — N40.0 BENIGN PROSTATIC HYPERPLASIA WITHOUT LOWER URINARY TRACT SYMPTOMS: ICD-10-CM

## 2025-04-22 DIAGNOSIS — I10 BENIGN ESSENTIAL HYPERTENSION: Primary | ICD-10-CM

## 2025-04-22 DIAGNOSIS — E78.2 HYPERLIPEMIA, MIXED: ICD-10-CM

## 2025-04-22 PROBLEM — I13.0 HYPERTENSIVE HEART AND CHRONIC KIDNEY DISEASE WITH HEART FAILURE AND STAGE 1 THROUGH STAGE 4 CHRONIC KIDNEY DISEASE, OR UNSPECIFIED CHRONIC KIDNEY DISEASE: Status: ACTIVE | Noted: 2025-04-22

## 2025-04-22 PROBLEM — H61.20 IMPACTED CERUMEN: Status: RESOLVED | Noted: 2024-09-27 | Resolved: 2025-04-22

## 2025-04-22 PROCEDURE — 99214 OFFICE O/P EST MOD 30 MIN: CPT | Performed by: INTERNAL MEDICINE

## 2025-04-22 PROCEDURE — 3077F SYST BP >= 140 MM HG: CPT | Performed by: INTERNAL MEDICINE

## 2025-04-22 PROCEDURE — 1036F TOBACCO NON-USER: CPT | Performed by: INTERNAL MEDICINE

## 2025-04-22 PROCEDURE — 1160F RVW MEDS BY RX/DR IN RCRD: CPT | Performed by: INTERNAL MEDICINE

## 2025-04-22 PROCEDURE — G2211 COMPLEX E/M VISIT ADD ON: HCPCS | Performed by: INTERNAL MEDICINE

## 2025-04-22 PROCEDURE — 1157F ADVNC CARE PLAN IN RCRD: CPT | Performed by: INTERNAL MEDICINE

## 2025-04-22 PROCEDURE — 3079F DIAST BP 80-89 MM HG: CPT | Performed by: INTERNAL MEDICINE

## 2025-04-22 PROCEDURE — 1123F ACP DISCUSS/DSCN MKR DOCD: CPT | Performed by: INTERNAL MEDICINE

## 2025-04-22 PROCEDURE — 1159F MED LIST DOCD IN RCRD: CPT | Performed by: INTERNAL MEDICINE

## 2025-04-22 RX ORDER — RAMIPRIL 10 MG/1
10 CAPSULE ORAL DAILY
Qty: 90 CAPSULE | Refills: 3 | Status: SHIPPED | OUTPATIENT
Start: 2025-04-22 | End: 2026-04-22

## 2025-04-22 ASSESSMENT — PATIENT HEALTH QUESTIONNAIRE - PHQ9
2. FEELING DOWN, DEPRESSED OR HOPELESS: NOT AT ALL
1. LITTLE INTEREST OR PLEASURE IN DOING THINGS: NOT AT ALL
SUM OF ALL RESPONSES TO PHQ9 QUESTIONS 1 AND 2: 0
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
## 2025-04-23 LAB
ALBUMIN SERPL-MCNC: 4.4 G/DL (ref 3.6–5.1)
ALBUMIN/CREAT UR: 96 MG/G CREAT
ALP SERPL-CCNC: 84 U/L (ref 35–144)
ALT SERPL-CCNC: 12 U/L (ref 9–46)
ANION GAP SERPL CALCULATED.4IONS-SCNC: 6 MMOL/L (CALC) (ref 7–17)
AST SERPL-CCNC: 22 U/L (ref 10–35)
BASOPHILS # BLD AUTO: 42 CELLS/UL (ref 0–200)
BASOPHILS NFR BLD AUTO: 0.6 %
BILIRUB SERPL-MCNC: 0.6 MG/DL (ref 0.2–1.2)
BUN SERPL-MCNC: 12 MG/DL (ref 7–25)
CALCIUM SERPL-MCNC: 9.1 MG/DL (ref 8.6–10.3)
CHLORIDE SERPL-SCNC: 104 MMOL/L (ref 98–110)
CK SERPL-CCNC: 90 U/L (ref 17–247)
CO2 SERPL-SCNC: 30 MMOL/L (ref 20–32)
CREAT SERPL-MCNC: 1 MG/DL (ref 0.7–1.22)
CREAT UR-MCNC: 28 MG/DL (ref 20–320)
EGFRCR SERPLBLD CKD-EPI 2021: 74 ML/MIN/1.73M2
EOSINOPHIL # BLD AUTO: 112 CELLS/UL (ref 15–500)
EOSINOPHIL NFR BLD AUTO: 1.6 %
ERYTHROCYTE [DISTWIDTH] IN BLOOD BY AUTOMATED COUNT: 13.3 % (ref 11–15)
GLUCOSE SERPL-MCNC: 98 MG/DL (ref 65–139)
HCT VFR BLD AUTO: 42 % (ref 38.5–50)
HGB BLD-MCNC: 13.7 G/DL (ref 13.2–17.1)
LYMPHOCYTES # BLD AUTO: 1085 CELLS/UL (ref 850–3900)
LYMPHOCYTES NFR BLD AUTO: 15.5 %
MAGNESIUM SERPL-MCNC: 2 MG/DL (ref 1.5–2.5)
MCH RBC QN AUTO: 29.6 PG (ref 27–33)
MCHC RBC AUTO-ENTMCNC: 32.6 G/DL (ref 32–36)
MCV RBC AUTO: 90.7 FL (ref 80–100)
MICROALBUMIN UR-MCNC: 2.7 MG/DL
MONOCYTES # BLD AUTO: 637 CELLS/UL (ref 200–950)
MONOCYTES NFR BLD AUTO: 9.1 %
NEUTROPHILS # BLD AUTO: 5124 CELLS/UL (ref 1500–7800)
NEUTROPHILS NFR BLD AUTO: 73.2 %
PLATELET # BLD AUTO: 268 THOUSAND/UL (ref 140–400)
PMV BLD REES-ECKER: 10.2 FL (ref 7.5–12.5)
POTASSIUM SERPL-SCNC: 4.7 MMOL/L (ref 3.5–5.3)
PROT SERPL-MCNC: 6.5 G/DL (ref 6.1–8.1)
RBC # BLD AUTO: 4.63 MILLION/UL (ref 4.2–5.8)
SODIUM SERPL-SCNC: 140 MMOL/L (ref 135–146)
TSH SERPL-ACNC: 1 MIU/L (ref 0.4–4.5)
URATE SERPL-MCNC: 6.1 MG/DL (ref 4–8)
WBC # BLD AUTO: 7 THOUSAND/UL (ref 3.8–10.8)

## 2025-04-24 ENCOUNTER — TELEPHONE (OUTPATIENT)
Dept: PRIMARY CARE | Facility: CLINIC | Age: 85
End: 2025-04-24
Payer: MEDICARE

## 2025-04-24 NOTE — TELEPHONE ENCOUNTER
----- Message from Annamarie Gaspar sent at 4/24/2025  8:30 AM EDT -----  Proteinuria advised low protein diet advised to get a bone density scan and echocardiogram next visit  ----- Message -----  From: Mckenzie Durbin Results In  Sent: 4/22/2025  11:20 PM EDT  To: Annamarie Gaspar MD

## 2025-06-09 DIAGNOSIS — E78.2 HYPERLIPEMIA, MIXED: ICD-10-CM

## 2025-06-09 RX ORDER — SIMVASTATIN 40 MG/1
40 TABLET, FILM COATED ORAL NIGHTLY
Qty: 90 TABLET | Refills: 3 | Status: SHIPPED | OUTPATIENT
Start: 2025-06-09

## 2025-08-22 ENCOUNTER — APPOINTMENT (OUTPATIENT)
Dept: PRIMARY CARE | Facility: CLINIC | Age: 85
End: 2025-08-22
Payer: MEDICARE

## 2025-08-22 VITALS
HEART RATE: 84 BPM | OXYGEN SATURATION: 98 % | HEIGHT: 67 IN | WEIGHT: 144 LBS | SYSTOLIC BLOOD PRESSURE: 157 MMHG | DIASTOLIC BLOOD PRESSURE: 74 MMHG | TEMPERATURE: 98.1 F | BODY MASS INDEX: 22.6 KG/M2

## 2025-08-22 DIAGNOSIS — H61.23 BILATERAL IMPACTED CERUMEN: Primary | ICD-10-CM

## 2025-08-22 DIAGNOSIS — E78.2 HYPERLIPEMIA, MIXED: ICD-10-CM

## 2025-08-22 DIAGNOSIS — Z86.711 HISTORY OF PULMONARY EMBOLISM: ICD-10-CM

## 2025-08-22 DIAGNOSIS — I51.7 LVH (LEFT VENTRICULAR HYPERTROPHY): ICD-10-CM

## 2025-08-22 DIAGNOSIS — N40.0 BENIGN PROSTATIC HYPERPLASIA WITHOUT LOWER URINARY TRACT SYMPTOMS: ICD-10-CM

## 2025-08-22 DIAGNOSIS — N52.01 ERECTILE DYSFUNCTION DUE TO ARTERIAL INSUFFICIENCY: ICD-10-CM

## 2025-08-22 DIAGNOSIS — Z86.718 HISTORY OF DVT (DEEP VEIN THROMBOSIS): ICD-10-CM

## 2025-08-22 DIAGNOSIS — I10 BENIGN ESSENTIAL HYPERTENSION: ICD-10-CM

## 2025-08-22 PROBLEM — Z86.69 HISTORY OF GLAUCOMA: Status: RESOLVED | Noted: 2024-03-28 | Resolved: 2025-08-22

## 2025-08-22 PROBLEM — I13.0 HYPERTENSIVE HEART AND CHRONIC KIDNEY DISEASE WITH HEART FAILURE AND STAGE 1 THROUGH STAGE 4 CHRONIC KIDNEY DISEASE, OR UNSPECIFIED CHRONIC KIDNEY DISEASE: Status: RESOLVED | Noted: 2025-04-22 | Resolved: 2025-08-22

## 2025-08-22 PROBLEM — I82.409 ACUTE EMBOLISM AND THROMBOSIS OF UNSPECIFIED DEEP VEINS OF UNSPECIFIED LOWER EXTREMITY: Status: RESOLVED | Noted: 2024-01-22 | Resolved: 2025-08-22

## 2025-08-22 LAB
PSA SERPL-MCNC: 2.27 NG/ML
TESTOST FREE SERPL-MCNC: NORMAL PG/ML
TESTOST SERPL-MCNC: NORMAL NG/DL

## 2025-08-22 RX ORDER — LOSARTAN POTASSIUM 25 MG/1
25 TABLET ORAL DAILY
Qty: 90 TABLET | Refills: 3 | Status: SHIPPED | OUTPATIENT
Start: 2025-08-22

## 2025-08-22 RX ORDER — FINASTERIDE 5 MG/1
5 TABLET, FILM COATED ORAL NIGHTLY
Qty: 90 TABLET | Refills: 3 | Status: SHIPPED | OUTPATIENT
Start: 2025-08-22 | End: 2026-08-22

## 2025-08-22 ASSESSMENT — PATIENT HEALTH QUESTIONNAIRE - PHQ9
1. LITTLE INTEREST OR PLEASURE IN DOING THINGS: NOT AT ALL
2. FEELING DOWN, DEPRESSED OR HOPELESS: NOT AT ALL
SUM OF ALL RESPONSES TO PHQ9 QUESTIONS 1 AND 2: 0

## 2025-08-27 LAB
PSA SERPL-MCNC: 2.27 NG/ML
TESTOST FREE SERPL-MCNC: 59.9 PG/ML (ref 30–135)
TESTOST SERPL-MCNC: 722 NG/DL (ref 250–1100)

## 2025-09-23 ENCOUNTER — APPOINTMENT (OUTPATIENT)
Dept: PRIMARY CARE | Facility: CLINIC | Age: 85
End: 2025-09-23
Payer: MEDICARE